# Patient Record
Sex: MALE | Race: OTHER | Employment: FULL TIME | URBAN - METROPOLITAN AREA
[De-identification: names, ages, dates, MRNs, and addresses within clinical notes are randomized per-mention and may not be internally consistent; named-entity substitution may affect disease eponyms.]

---

## 2020-06-10 ENCOUNTER — TELEPHONE (OUTPATIENT)
Dept: ADMINISTRATIVE | Age: 50
End: 2020-06-10

## 2020-06-10 ENCOUNTER — TELEPHONE (OUTPATIENT)
Dept: INTERNAL MEDICINE CLINIC | Age: 50
End: 2020-06-10

## 2020-06-10 NOTE — TELEPHONE ENCOUNTER
Patient's brother called to make appointment to establish pcp. Attempted to conference in [address] [facility] . Phone line disconnected.

## 2021-10-14 ENCOUNTER — TELEPHONE (OUTPATIENT)
Dept: FAMILY MEDICINE CLINIC | Age: 51
End: 2021-10-14

## 2021-10-14 NOTE — TELEPHONE ENCOUNTER
Pt would like to be a NP with Dr Tisha Ward.    He needs a Canadian speaking Dr    Please advise    CB:  [phone]

## 2022-01-13 ENCOUNTER — OFFICE VISIT (OUTPATIENT)
Dept: ORTHOPEDIC SURGERY | Age: 52
End: 2022-01-13
Payer: COMMERCIAL

## 2022-01-13 VITALS — WEIGHT: 200 LBS | BODY MASS INDEX: 31.39 KG/M2 | HEIGHT: 67 IN

## 2022-01-13 DIAGNOSIS — S83.242A ACUTE MEDIAL MENISCUS TEAR, LEFT, INITIAL ENCOUNTER: ICD-10-CM

## 2022-01-13 DIAGNOSIS — M25.562 LEFT KNEE PAIN, UNSPECIFIED CHRONICITY: Primary | ICD-10-CM

## 2022-01-13 PROCEDURE — G8427 DOCREV CUR MEDS BY ELIG CLIN: HCPCS | Performed by: ORTHOPAEDIC SURGERY

## 2022-01-13 PROCEDURE — 1036F TOBACCO NON-USER: CPT | Performed by: ORTHOPAEDIC SURGERY

## 2022-01-13 PROCEDURE — 3017F COLORECTAL CA SCREEN DOC REV: CPT | Performed by: ORTHOPAEDIC SURGERY

## 2022-01-13 PROCEDURE — G8419 CALC BMI OUT NRM PARAM NOF/U: HCPCS | Performed by: ORTHOPAEDIC SURGERY

## 2022-01-13 PROCEDURE — 99203 OFFICE O/P NEW LOW 30 MIN: CPT | Performed by: ORTHOPAEDIC SURGERY

## 2022-01-13 PROCEDURE — G8484 FLU IMMUNIZE NO ADMIN: HCPCS | Performed by: ORTHOPAEDIC SURGERY

## 2022-01-13 NOTE — PROGRESS NOTES
[facility]  History and Physical  Knee Pain    Date:  2022    Name:  Joe Henderson  Address:  [address] [place] [address]    :  1970      Age:   46 y.o.    SSN:  [id number]      Medical Record Number:  [id number]      Chief Complaint    New Patient (L knee)      History of Present Illness:  Joe Henderson is a 46 y.o. male who presents for left knee pain of 3 weeks duration. Patient works as a , used to work as a  with no specific injury started to complain of left knee pain, pain increases by bending and twisting activities, relieved by rest.  Patient is taking ibuprofen 800 mg to decrease his pain with moderate improvement, denies locking of the knee joint. Denies any previous history of similar symptoms. Patient also complains of bilateral ankle pain that started after her knee pain. The patient denies any new injury. The patient denies any clicking, popping, catching, giving way, joint locking, numbness, paresthesias, or weakness.       Pain Assessment  Location of Pain: Knee  Location Modifiers: Left  Quality of Pain: Dull,Aching  Duration of Pain: Persistent  Frequency of Pain: Constant  Result of Injury: No  Work-Related Injury: No  Are there other pain locations you wish to document?: No    Past Medical History:   Diagnosis Date    Appendicitis         Past Surgical History:   Procedure Laterality Date    APPENDECTOMY         Family History   Problem Relation Age of Onset    Diabetes Father     Diabetes Paternal Grandmother     Diabetes Paternal Uncle     Prostate Cancer Paternal Uncle     Prostate Cancer Paternal Uncle        Social History     Socioeconomic History    Marital status:      Spouse name: None    Number of children: 1    Years of education: None    Highest education level: None   Occupational History    None   Tobacco Use    Smoking status: Never Smoker    Smokeless tobacco: Never Used   Substance and Sexual Activity    Alcohol use: Yes     Alcohol/week: 5.0 standard drinks     Types: 6 drink(s) per week    Drug use: No    Sexual activity: Never   Other Topics Concern    None   Social History Narrative    None     Social Determinants of Health     Financial Resource Strain:     Difficulty of Paying Living Expenses: Not on file   Food Insecurity:     Worried About Running Out of Food in the Last Year: Not on file    Patti of Food in the Last Year: Not on file   Transportation Needs:     Lack of Transportation (Medical): Not on file    Lack of Transportation (Non-Medical): Not on file   Physical Activity:     Days of Exercise per Week: Not on file    Minutes of Exercise per Session: Not on file   Stress:     Feeling of Stress : Not on file   Social Connections:     Frequency of Communication with Friends and Family: Not on file    Frequency of Social Gatherings with Friends and Family: Not on file    Attends Anabaptism Services: Not on file    Active Member of [address] [place] [address] or Organizations: Not on file    Attends Club or Organization Meetings: Not on file    Marital Status: Not on file   Intimate Partner Violence:     Fear of Current or Ex-Partner: Not on file    Emotionally Abused: Not on file    Physically Abused: Not on file    Sexually Abused: Not on file   Housing Stability:     Unable to Pay for Housing in the Last Year: Not on file    Number of [place] in the Last Year: Not on file    Unstable Housing in the Last Year: Not on file       Current Outpatient Medications   Medication Sig Dispense Refill    sildenafil (VIAGRA) 50 MG tablet Take 1 tablet by mouth as needed for Erectile Dysfunction. 15 tablet 3    triamcinolone (KENALOG) 0.1 % lotion Apply topically to affected area  2- 3 times daily. 30 mL 1    clotrimazole (LOTRIMIN) 1 % cream Apply topically 2 times daily.  30 g 1    omeprazole (PRILOSEC) 20 MG capsule Take 1 capsule by mouth daily. 30 capsule 3     No current facility-administered medications for this visit. No Known Allergies      Review of Systems:  A 14 point review of systems was completed by the patient and is available in the media section of the scanned medical record and was reviewed on 1/13/2022. The review is negative with the exception of those things mentioned in the HPI and Past Medical History     Review of Systems   Constitutional: Negative for fever and chills. HENT: Negative for congestion and eye pain. Eyes: Negative for blurred vision and double vision. Respiratory: Negative for cough, shortness of breath and wheezing. Cardiovascular: Negative for chest pain and palpitations. Gastrointestinal: Negative for nausea. Negative for vomiting. Musculoskeletal: Positive for myalgias and joint pain left knee. Skin: Negative for itching and rash. Neurological: Negative for dizziness, sensory change and headaches. Psychiatric/Behavioral: Negative for depression and suicidal ideas. Vital Signs:   Ht 5' 7\" (1.702 m)   Wt 200 lb (90.7 kg)   BMI 31.32 kg/m²     General Exam:    General: Angeles Padilla is a healthy and well appearing 46y.o. year old male who is sitting comfortably in our office in no acute distress. Neuro: Alert & Oriented x 3,  normal,  no focal deficits noted. Normal mood & affect. Knee Examination: Right    Inspection: Mild effusion, no ecchymosis, discoloration, erythema, excessive warmth. no gross deformities, no signs of infection. Palpation: There is positive patellofemoral crepitus, there is medial joint line tenderness. No other osseous or soft tissue tenderness around the knee. Negative calf tenderness    Range of Motion:   0-130 degrees without pain or difficulty    Strength:  5/5 quadriceps, 5/5 hamstrings    Special Tests:   No ligament instability, valgus and varus stress test are stable at 0 and 30°. Lachman's exhibits a solid endpoint.   Negative posterior drawer. Negative Homans sign    Gait:  Steady, Non antalgic, without the use of assistive devices    Alignment:  Varus deformity    Neuro: Sensation equal bilateral lower extremities    Vascular: 2+ posterior tibialis pulse      Contralateral Knee Comparison Examination: Left    Inspection:  No effusion, ecchymosis, discoloration, erythema, excessive warmth. no gross deformities, no signs of infection. Palpation: There is no patellofemoral crepitus, there is no joint line tenderness. No other osseous or soft tissue tenderness around the knee. Negative calf tenderness    Range of Motion:  0-130 degrees without pain or difficulty    Strength:  5/5 quadriceps, 5/5 hamstrings    Special Tests: Positive hyperflexion test   No ligament instability, valgus and varus stress test are stable at 0 and 30°. Lachman's exhibits a solid endpoint. Negative posterior drawer. Negative Homans sign    Gait:  Steady, Non antalgic, without the use of assistive devices    Alignment: No Varus deformity    Neuro: Sensation equal bilateral lower extremities    Vascular: 2+ posterior tibialis pulse      Radiology:     X-rays obtained and reviewed in office: AP and merchant view of both knees and a lateral of the left:  No abnormality detected apart from mild knee osteoarthritis.              Impression:  No abnormality detected on x-ray        Office Procedures:  Orders Placed This Encounter   Procedures    XR KNEE LEFT (3 VIEWS)     Standing Status:   Future     Number of Occurrences:   1     Standing Expiration Date:   1/13/2023     Order Specific Question:   Reason for exam:     Answer:   pain            Assessment: Patient is a 46 y.o. male suspected left knee medial meniscus tear    Visit Diagnoses       Codes    Left knee pain, unspecified chronicity    -  Primary M25.562    Acute medial meniscus tear, left, initial encounter     S83.242A          No orders of the defined types were placed in this encounter. Medical decision making:  Patient's workup and evaluation were reviewed with the patient in the office today. All the patient's questions were answered while in the clinic. The patient is understanding of all instructions and agrees with the plan. Patient has medial joint line tenderness with positive hyperflexion test, which raises the concern of medial viscus tear, we will proceed with left knee MRI without contrast, we will follow the patient after the MRI    Treatment Plan:    1. Left knee MRI  2. Activity modification  3. Ice 20 minutes ever 1-2 hours PRN  4. NSAIDs as needed  5. Rest   6. Elevation at least 2 inches above the heart with pillows supporting the joint underneath  7. Lightweight exercise/low impact exercise  8. Appropriate diet/weight management      Follow up: After MRI  This patient exhibits moderate complexity given previous history and physical exam, review of previous surgeries, obtaining independent history from the patient, review of the x-ray imaging and independent interpretation of imaging, and coordinating care with the patient as well as another physician. The patient is moderate risk for planning of an elective surgery with multiple comorbidities/risks. Total time spent for evaluation, education, and development of treatment plan: 30 minutes. Josefina Reese MD  Orthopedic Surgeon  [facility]), Clinical Fellow, [address] [place] [address]   1/13/2022      This dictation was performed with a verbal recognition program [facility] [employer]) and it was checked for errors. It is possible that there are still dictated errors within this office note. If so, please bring any errors to my attention for an addendum. All efforts were made to ensure that this office note is accurate. This dictation was performed with a verbal recognition program (DRAGON) and it was checked for errors. It is possible that there are still dictated errors within this office note.   If so, please bring any errors to my attention for an addendum. All efforts were made to ensure that this office note is accurate. Supervising Physician Attestation:  I, Dr. Malick Schmidt, personally performed the services described in this documentation as scribed above, and it is both accurate and complete and I agree with all pertinent clinical information. I personally interviewed the patient and performed a physical examination and medical decision making. I discussed the patient's condition and treatment options and have  reviewed and agree with the past medical, family and social history unless otherwise noted. All of the patient's questions were answered.       Board Certified Orthopaedic Surgeon  [facility] and [address] and [address] and [employer]  Professor of [address]

## 2022-01-17 ENCOUNTER — TELEPHONE (OUTPATIENT)
Dept: ORTHOPEDIC SURGERY | Age: 52
End: 2022-01-17

## 2022-01-17 NOTE — TELEPHONE ENCOUNTER
General Question     Subject: PATIENTS SON IS CALLING REGARDING INSURANCE APPROVAL FOR CORTISONE INJECTIONS IN PATIENTS L KNEE. PLEASE ADVISE.    Patient: Fausto Hernandez  Contact Number: [phone]

## 2022-01-18 DIAGNOSIS — S83.242A ACUTE MEDIAL MENISCUS TEAR, LEFT, INITIAL ENCOUNTER: Primary | ICD-10-CM

## 2022-01-19 ENCOUNTER — TELEPHONE (OUTPATIENT)
Dept: ORTHOPEDIC SURGERY | Age: 52
End: 2022-01-19

## 2022-01-19 NOTE — TELEPHONE ENCOUNTER
Called son and told him someone from [employer] will contact him to make an appointment for his dad

## 2022-01-19 NOTE — TELEPHONE ENCOUNTER
General Question     Subject: PATIENT'S SON IS CALLING WANTING TO KNOW IF INSURANCE APPROVAL HAS BEEN GIVEN FOR LEFT KNEE INJECTION. PLEASE ADVISE.       Roberto Olsen  Contact Number: [phone]

## 2022-01-25 ENCOUNTER — OFFICE VISIT (OUTPATIENT)
Dept: ORTHOPEDIC SURGERY | Age: 52
End: 2022-01-25
Payer: COMMERCIAL

## 2022-01-25 ENCOUNTER — TELEPHONE (OUTPATIENT)
Dept: ORTHOPEDIC SURGERY | Age: 52
End: 2022-01-25

## 2022-01-25 VITALS — BODY MASS INDEX: 31.39 KG/M2 | HEIGHT: 67 IN | WEIGHT: 200 LBS

## 2022-01-25 DIAGNOSIS — S83.282A ACUTE LATERAL MENISCUS TEAR OF LEFT KNEE, INITIAL ENCOUNTER: ICD-10-CM

## 2022-01-25 DIAGNOSIS — S83.242A ACUTE MEDIAL MENISCUS TEAR OF LEFT KNEE, INITIAL ENCOUNTER: Primary | ICD-10-CM

## 2022-01-25 PROCEDURE — G8427 DOCREV CUR MEDS BY ELIG CLIN: HCPCS | Performed by: ORTHOPAEDIC SURGERY

## 2022-01-25 PROCEDURE — 99214 OFFICE O/P EST MOD 30 MIN: CPT | Performed by: ORTHOPAEDIC SURGERY

## 2022-01-25 PROCEDURE — G8484 FLU IMMUNIZE NO ADMIN: HCPCS | Performed by: ORTHOPAEDIC SURGERY

## 2022-01-25 PROCEDURE — 3017F COLORECTAL CA SCREEN DOC REV: CPT | Performed by: ORTHOPAEDIC SURGERY

## 2022-01-25 PROCEDURE — G8417 CALC BMI ABV UP PARAM F/U: HCPCS | Performed by: ORTHOPAEDIC SURGERY

## 2022-01-25 PROCEDURE — MISC250 COMPRESSION STOCKING: Performed by: ORTHOPAEDIC SURGERY

## 2022-01-25 PROCEDURE — 1036F TOBACCO NON-USER: CPT | Performed by: ORTHOPAEDIC SURGERY

## 2022-01-25 NOTE — PROGRESS NOTES
12 [facility]  History and Physical  Knee Pain    Date:  2022    Name:  Cecy Brennan  Address:  [address] [place] [address],[address]    :  1970      Age:   46 y.o.    SSN:  [id number]      Medical Record Number:  [id number]      Chief Complaint    Follow-up (MRI L knee)      History of Present Illness:  Cecy Brennan is a 46 y.o. male who presents for left knee pain of 5 weeks duration. Patient works as a , used to work as a  with no specific injury started to complain of left knee pain, pain increases by bending and twisting activities, relieved by rest.  Patient is taking ibuprofen 800 mg to decrease his pain with moderate improvement, denies locking of the knee joint. Denies any previous history of similar symptoms. Last time patient was seen we ordered an MRI. Patient is here today with MRI results    The patient denies any new injury. The patient denies any clicking, popping, catching, giving way, joint locking, numbness, paresthesias, or weakness. Past Medical History:   Diagnosis Date    Appendicitis         Past Surgical History:   Procedure Laterality Date    APPENDECTOMY         Family History   Problem Relation Age of Onset    Diabetes Father     Diabetes Paternal Grandmother     Diabetes Paternal Uncle     Prostate Cancer Paternal Uncle     Prostate Cancer Paternal Uncle        Social History     Socioeconomic History    Marital status:      Spouse name: None    Number of children: 1    Years of education: None    Highest education level: None   Occupational History    None   Tobacco Use    Smoking status: Never Smoker    Smokeless tobacco: Never Used   Substance and Sexual Activity    Alcohol use:  Yes     Alcohol/week: 5.0 standard drinks     Types: 6 drink(s) per week    Drug use: No    Sexual activity: Never   Other Topics Concern    None   Social History Narrative    None     Social Determinants of Health     Financial Resource Strain:     Difficulty of Paying Living Expenses: Not on file   Food Insecurity:     Worried About Running Out of Food in the Last Year: Not on file    Patti of Food in the Last Year: Not on file   Transportation Needs:     Lack of Transportation (Medical): Not on file    Lack of Transportation (Non-Medical): Not on file   Physical Activity:     Days of Exercise per Week: Not on file    Minutes of Exercise per Session: Not on file   Stress:     Feeling of Stress : Not on file   Social Connections:     Frequency of Communication with Friends and Family: Not on file    Frequency of Social Gatherings with Friends and Family: Not on file    Attends Jewish Services: Not on file    Active Member of [address] [place] [address] [email or web address] or Organizations: Not on file    Attends Club or Organization Meetings: Not on file    Marital Status: Not on file   Intimate Partner Violence:     Fear of Current or Ex-Partner: Not on file    Emotionally Abused: Not on file    Physically Abused: Not on file    Sexually Abused: Not on file   Housing Stability:     Unable to Pay for Housing in the Last Year: Not on file    Number of [place] in the Last Year: Not on file    Unstable Housing in the Last Year: Not on file       Current Outpatient Medications   Medication Sig Dispense Refill    sildenafil (VIAGRA) 50 MG tablet Take 1 tablet by mouth as needed for Erectile Dysfunction. 15 tablet 3    triamcinolone (KENALOG) 0.1 % lotion Apply topically to affected area  2- 3 times daily. 30 mL 1    clotrimazole (LOTRIMIN) 1 % cream Apply topically 2 times daily. 30 g 1    omeprazole (PRILOSEC) 20 MG capsule Take 1 capsule by mouth daily. 30 capsule 3     No current facility-administered medications for this visit.        No Known Allergies      Review of Systems:  A 14 point review of systems was completed by the patient and is available in the media section of the scanned medical record and was reviewed on 1/25/2022. The review is negative with the exception of those things mentioned in the HPI and Past Medical History     Review of Systems   Constitutional: Negative for fever and chills. HENT: Negative for congestion and eye pain. Eyes: Negative for blurred vision and double vision. Respiratory: Negative for cough, shortness of breath and wheezing. Cardiovascular: Negative for chest pain and palpitations. Gastrointestinal: Negative for nausea. Negative for vomiting. Musculoskeletal: Positive for myalgias and joint pain left knee. Skin: Negative for itching and rash. Neurological: Negative for dizziness, sensory change and headaches. Psychiatric/Behavioral: Negative for depression and suicidal ideas. Vital Signs:   Ht 5' 7\" (1.702 m)   Wt 200 lb (90.7 kg)   BMI 31.32 kg/m²     General Exam:    General: Salome Hernandez is a healthy and well appearing 46y.o. year old male who is sitting comfortably in our office in no acute distress. Neuro: Alert & Oriented x 3,  normal,  no focal deficits noted. Normal mood & affect. Knee Examination: Right    Inspection: Mild effusion, no ecchymosis, discoloration, erythema, excessive warmth. no gross deformities, no signs of infection. Palpation: There is positive patellofemoral crepitus, there is medial joint line tenderness. No other osseous or soft tissue tenderness around the knee. Negative calf tenderness    Range of Motion:   0-130 degrees without pain or difficulty    Strength:  5/5 quadriceps, 5/5 hamstrings    Special Tests:   No ligament instability, valgus and varus stress test are stable at 0 and 30°. Lachman's exhibits a solid endpoint. Negative posterior drawer.   Negative Homans sign    Gait:  Steady, Non antalgic, without the use of assistive devices    Alignment:  Varus deformity    Neuro: Sensation equal bilateral lower extremities    Vascular: 2+ posterior tibialis pulse      Contralateral Knee Comparison Examination: Left    Inspection:  No effusion, ecchymosis, discoloration, erythema, excessive warmth. no gross deformities, no signs of infection. Palpation: There is no patellofemoral crepitus, there is no joint line tenderness. No other osseous or soft tissue tenderness around the knee. Negative calf tenderness    Range of Motion:  0-130 degrees without pain or difficulty    Strength:  5/5 quadriceps, 5/5 hamstrings    Special Tests: Positive hyperflexion test   No ligament instability, valgus and varus stress test are stable at 0 and 30°. Lachman's exhibits a solid endpoint. Negative posterior drawer. Negative Homans sign    Gait:  Steady, Non antalgic, without the use of assistive devices    Alignment: No Varus deformity    Neuro: Sensation equal bilateral lower extremities    Vascular: 2+ posterior tibialis pulse      Radiology:     Left knee MRI:  3-4 horizontal tear anterior body and posterior horn body junction of the lateral meniscus. 3-4 horizontal tear tear body of the medial meniscus  Grade 1 MCL sprain  Intermediate grade patellofemoral chondromalacia  Semimembranosus bursitis. Impression:  Acute medial and lateral meniscus tear of left knee        Office Procedures:  Orders Placed This Encounter   Procedures    COMPRESSION STOCKING     Patient was supplied a Compression Sleeve. This retail item was supplied to provide functional support and assist in controlling swelling in the lower extremities. Verbal and written instructions for the use of and application of this item were provided. The patient was educated and fit by a healthcare professional with expert knowledge and specialization in brace application. They were instructed to contact the office immediately should the equipment result in increased pain, decreased sensation, increased swelling or worsening of the condition.             Assessment: Patient is a 46 y.o. male left knee medial and lateral meniscus acute  Visit Diagnoses       Codes    Acute medial meniscus tear of left knee, initial encounter    -  Primary Z44.658E    Acute lateral meniscus tear of left knee, initial encounter     S83.282A          No orders of the defined types were placed in this encounter. Medical decision making:  Patient's workup and evaluation were reviewed with the patient in the office today. All the patient's questions were answered while in the clinic. The patient is understanding of all instructions and agrees with the plan. Patient has acute medial and lateral meniscus tear, we will proceed with left knee arthroscopic meniscectomy, we discussed that only the torn part of meniscus will be excised, we will preserve stable none torn part of the meniscus, we believe that the patient will still have some pain after the knee scope surgery due to his patellofemoral and medial tibiofemoral arthritic changes. Treatment Plan:    1. Left knee arthroscopic medial and lateral left knee meniscectomy/repair  2. Activity modification  3. Ice 20 minutes ever 1-2 hours PRN  4. NSAIDs as needed  5. Rest   6. Elevation at least 2 inches above the heart with pillows supporting the joint underneath  7. Lightweight exercise/low impact exercise  8. Appropriate diet/weight management      Follow up: Preop visit  This patient exhibits moderate complexity given previous history and physical exam, review of previous surgeries, obtaining independent history from the patient, review of the x-ray imaging and independent interpretation of imaging, and coordinating care with the patient as well as another physician. The patient is moderate risk for planning of an elective surgery with multiple comorbidities/risks. Total time spent for evaluation, education, and development of treatment plan: 30 minutes.       Salomon Real MD  Orthopedic Surgeon  [facility]), Clinical Fellow, [address] [place] [address]   1/25/2022      This dictation was performed with a verbal recognition program (DRAGON) and it was checked for errors. It is possible that there are still dictated errors within this office note. If so, please bring any errors to my attention for an addendum. All efforts were made to ensure that this office note is accurate. This dictation was performed with a verbal recognition program (DRAGON) and it was checked for errors. It is possible that there are still dictated errors within this office note. If so, please bring any errors to my attention for an addendum. All efforts were made to ensure that this office note is accurate. Supervising Physician Attestation:  I, Dr. Scotty Cordoba, personally performed the services described in this documentation as scribed above, and it is both accurate and complete and I agree with all pertinent clinical information. I personally interviewed the patient and performed a physical examination and medical decision making. I discussed the patient's condition and treatment options and have  reviewed and agree with the past medical, family and social history unless otherwise noted. All of the patient's questions were answered.       Board Certified Orthopaedic Surgeon  [facility] and [address]  [facility] and [address] and [employer]  Professor of [address]

## 2022-01-26 NOTE — TELEPHONE ENCOUNTER
Talked to son and the insurance will be [facility] but a different #.  Will get # too me

## 2022-01-28 ENCOUNTER — TELEPHONE (OUTPATIENT)
Dept: ORTHOPEDIC SURGERY | Age: 52
End: 2022-01-28

## 2022-01-31 NOTE — TELEPHONE ENCOUNTER
Returned sons call and he still does not have the correct ID # for [facility].  Will call me back when he had the correct number

## 2022-02-01 ENCOUNTER — ANESTHESIA EVENT (OUTPATIENT)
Dept: OPERATING ROOM | Age: 52
End: 2022-02-01
Payer: COMMERCIAL

## 2022-02-01 ENCOUNTER — TELEPHONE (OUTPATIENT)
Dept: ORTHOPEDIC SURGERY | Age: 52
End: 2022-02-01

## 2022-02-01 NOTE — TELEPHONE ENCOUNTER
PATIENT IS ON FOR TOMORROW, INSURANCE IS STILL PENDING. NEEDS TO KNOW IF THE SURGERY IS STILL HAPPENING.

## 2022-02-02 ENCOUNTER — ANESTHESIA (OUTPATIENT)
Dept: OPERATING ROOM | Age: 52
End: 2022-02-02
Payer: COMMERCIAL

## 2022-02-02 ENCOUNTER — HOSPITAL ENCOUNTER (OUTPATIENT)
Age: 52
Setting detail: OUTPATIENT SURGERY
Discharge: HOME | End: 2022-02-02
Attending: ORTHOPAEDIC SURGERY | Admitting: ORTHOPAEDIC SURGERY
Payer: COMMERCIAL

## 2022-02-02 VITALS
DIASTOLIC BLOOD PRESSURE: 81 MMHG | OXYGEN SATURATION: 100 % | SYSTOLIC BLOOD PRESSURE: 123 MMHG | TEMPERATURE: 96.3 F | RESPIRATION RATE: 12 BRPM

## 2022-02-02 VITALS
WEIGHT: 215 LBS | OXYGEN SATURATION: 96 % | DIASTOLIC BLOOD PRESSURE: 93 MMHG | BODY MASS INDEX: 32.58 KG/M2 | HEART RATE: 77 BPM | TEMPERATURE: 96.8 F | SYSTOLIC BLOOD PRESSURE: 139 MMHG | RESPIRATION RATE: 21 BRPM | HEIGHT: 68 IN

## 2022-02-02 DIAGNOSIS — Z98.890 S/P LEFT KNEE ARTHROSCOPY: Primary | ICD-10-CM

## 2022-02-02 PROCEDURE — 2580000003 HC RX 258: Performed by: ANESTHESIOLOGY

## 2022-02-02 PROCEDURE — 7100000001 HC PACU RECOVERY - ADDTL 15 MIN: Performed by: ORTHOPAEDIC SURGERY

## 2022-02-02 PROCEDURE — 6370000000 HC RX 637 (ALT 250 FOR IP): Performed by: FAMILY MEDICINE

## 2022-02-02 PROCEDURE — 2720000010 HC SURG SUPPLY STERILE: Performed by: ORTHOPAEDIC SURGERY

## 2022-02-02 PROCEDURE — 7100000000 HC PACU RECOVERY - FIRST 15 MIN: Performed by: ORTHOPAEDIC SURGERY

## 2022-02-02 PROCEDURE — 6360000002 HC RX W HCPCS: Performed by: NURSE ANESTHETIST, CERTIFIED REGISTERED

## 2022-02-02 PROCEDURE — 3700000000 HC ANESTHESIA ATTENDED CARE: Performed by: ORTHOPAEDIC SURGERY

## 2022-02-02 PROCEDURE — 7100000011 HC PHASE II RECOVERY - ADDTL 15 MIN: Performed by: ORTHOPAEDIC SURGERY

## 2022-02-02 PROCEDURE — 2580000003 HC RX 258: Performed by: ORTHOPAEDIC SURGERY

## 2022-02-02 PROCEDURE — 3700000001 HC ADD 15 MINUTES (ANESTHESIA): Performed by: ORTHOPAEDIC SURGERY

## 2022-02-02 PROCEDURE — 2709999900 HC NON-CHARGEABLE SUPPLY: Performed by: ORTHOPAEDIC SURGERY

## 2022-02-02 PROCEDURE — 7100000010 HC PHASE II RECOVERY - FIRST 15 MIN: Performed by: ORTHOPAEDIC SURGERY

## 2022-02-02 PROCEDURE — 6360000002 HC RX W HCPCS: Performed by: FAMILY MEDICINE

## 2022-02-02 PROCEDURE — 2500000003 HC RX 250 WO HCPCS: Performed by: NURSE ANESTHETIST, CERTIFIED REGISTERED

## 2022-02-02 PROCEDURE — 3600000014 HC SURGERY LEVEL 4 ADDTL 15MIN: Performed by: ORTHOPAEDIC SURGERY

## 2022-02-02 PROCEDURE — 6360000002 HC RX W HCPCS: Performed by: ORTHOPAEDIC SURGERY

## 2022-02-02 PROCEDURE — 3600000004 HC SURGERY LEVEL 4 BASE: Performed by: ORTHOPAEDIC SURGERY

## 2022-02-02 RX ORDER — IBUPROFEN 800 MG/1
800 TABLET ORAL EVERY 8 HOURS PRN
COMMUNITY
Start: 2022-01-03

## 2022-02-02 RX ORDER — LIDOCAINE HYDROCHLORIDE 10 MG/ML
1 INJECTION, SOLUTION EPIDURAL; INFILTRATION; INTRACAUDAL; PERINEURAL
Status: DISCONTINUED | OUTPATIENT
Start: 2022-02-02 | End: 2022-02-02 | Stop reason: HOSPADM

## 2022-02-02 RX ORDER — OXYCODONE HYDROCHLORIDE 5 MG/1
10 TABLET ORAL PRN
Status: COMPLETED | OUTPATIENT
Start: 2022-02-02 | End: 2022-02-02

## 2022-02-02 RX ORDER — SODIUM CHLORIDE 0.9 % (FLUSH) 0.9 %
5-40 SYRINGE (ML) INJECTION PRN
Status: DISCONTINUED | OUTPATIENT
Start: 2022-02-02 | End: 2022-02-02 | Stop reason: HOSPADM

## 2022-02-02 RX ORDER — ONDANSETRON 2 MG/ML
INJECTION INTRAMUSCULAR; INTRAVENOUS PRN
Status: DISCONTINUED | OUTPATIENT
Start: 2022-02-02 | End: 2022-02-02 | Stop reason: SDUPTHER

## 2022-02-02 RX ORDER — ROPIVACAINE HYDROCHLORIDE 5 MG/ML
INJECTION, SOLUTION EPIDURAL; INFILTRATION; PERINEURAL PRN
Status: DISCONTINUED | OUTPATIENT
Start: 2022-02-02 | End: 2022-02-02 | Stop reason: ALTCHOICE

## 2022-02-02 RX ORDER — PROMETHAZINE HYDROCHLORIDE 25 MG/ML
6.25 INJECTION, SOLUTION INTRAMUSCULAR; INTRAVENOUS PRN
Status: DISCONTINUED | OUTPATIENT
Start: 2022-02-02 | End: 2022-02-02 | Stop reason: HOSPADM

## 2022-02-02 RX ORDER — SENNA PLUS 8.6 MG/1
1 TABLET ORAL 2 TIMES DAILY PRN
Qty: 14 TABLET | Refills: 0 | Status: SHIPPED | OUTPATIENT
Start: 2022-02-02 | End: 2022-02-09

## 2022-02-02 RX ORDER — NAPROXEN 500 MG/1
500 TABLET ORAL 2 TIMES DAILY PRN
COMMUNITY
Start: 2022-01-18

## 2022-02-02 RX ORDER — FENTANYL CITRATE 50 UG/ML
INJECTION, SOLUTION INTRAMUSCULAR; INTRAVENOUS PRN
Status: DISCONTINUED | OUTPATIENT
Start: 2022-02-02 | End: 2022-02-02 | Stop reason: SDUPTHER

## 2022-02-02 RX ORDER — GLYCOPYRROLATE 1 MG/5 ML
SYRINGE (ML) INTRAVENOUS PRN
Status: DISCONTINUED | OUTPATIENT
Start: 2022-02-02 | End: 2022-02-02 | Stop reason: SDUPTHER

## 2022-02-02 RX ORDER — OXYCODONE HYDROCHLORIDE AND ACETAMINOPHEN 5; 325 MG/1; MG/1
1 TABLET ORAL EVERY 6 HOURS PRN
Qty: 35 TABLET | Refills: 0 | Status: SHIPPED | OUTPATIENT
Start: 2022-02-02 | End: 2022-02-09

## 2022-02-02 RX ORDER — EPHEDRINE SULFATE 50 MG/ML
INJECTION INTRAVENOUS PRN
Status: DISCONTINUED | OUTPATIENT
Start: 2022-02-02 | End: 2022-02-02 | Stop reason: SDUPTHER

## 2022-02-02 RX ORDER — ASPIRIN 81 MG/1
81 TABLET ORAL DAILY
Qty: 30 TABLET | Refills: 0 | Status: SHIPPED | OUTPATIENT
Start: 2022-02-02

## 2022-02-02 RX ORDER — LABETALOL HYDROCHLORIDE 5 MG/ML
5 INJECTION, SOLUTION INTRAVENOUS EVERY 10 MIN PRN
Status: DISCONTINUED | OUTPATIENT
Start: 2022-02-02 | End: 2022-02-02 | Stop reason: HOSPADM

## 2022-02-02 RX ORDER — SODIUM CHLORIDE 9 MG/ML
25 INJECTION, SOLUTION INTRAVENOUS PRN
Status: DISCONTINUED | OUTPATIENT
Start: 2022-02-02 | End: 2022-02-02 | Stop reason: HOSPADM

## 2022-02-02 RX ORDER — OXYCODONE HYDROCHLORIDE 5 MG/1
5 TABLET ORAL PRN
Status: COMPLETED | OUTPATIENT
Start: 2022-02-02 | End: 2022-02-02

## 2022-02-02 RX ORDER — ONDANSETRON 4 MG/1
4 TABLET, FILM COATED ORAL EVERY 8 HOURS PRN
Qty: 20 TABLET | Refills: 0 | Status: SHIPPED | OUTPATIENT
Start: 2022-02-02

## 2022-02-02 RX ORDER — MEPERIDINE HYDROCHLORIDE 25 MG/ML
12.5 INJECTION INTRAMUSCULAR; INTRAVENOUS; SUBCUTANEOUS EVERY 5 MIN PRN
Status: DISCONTINUED | OUTPATIENT
Start: 2022-02-02 | End: 2022-02-02 | Stop reason: HOSPADM

## 2022-02-02 RX ORDER — HYDRALAZINE HYDROCHLORIDE 20 MG/ML
5 INJECTION INTRAMUSCULAR; INTRAVENOUS EVERY 10 MIN PRN
Status: DISCONTINUED | OUTPATIENT
Start: 2022-02-02 | End: 2022-02-02 | Stop reason: HOSPADM

## 2022-02-02 RX ORDER — SODIUM CHLORIDE 0.9 % (FLUSH) 0.9 %
5-40 SYRINGE (ML) INJECTION EVERY 12 HOURS SCHEDULED
Status: DISCONTINUED | OUTPATIENT
Start: 2022-02-02 | End: 2022-02-02 | Stop reason: HOSPADM

## 2022-02-02 RX ORDER — SODIUM CHLORIDE, SODIUM LACTATE, POTASSIUM CHLORIDE, AND CALCIUM CHLORIDE .6; .31; .03; .02 G/100ML; G/100ML; G/100ML; G/100ML
IRRIGANT IRRIGATION PRN
Status: DISCONTINUED | OUTPATIENT
Start: 2022-02-02 | End: 2022-02-02 | Stop reason: ALTCHOICE

## 2022-02-02 RX ORDER — LIDOCAINE HYDROCHLORIDE 20 MG/ML
INJECTION, SOLUTION INTRAVENOUS PRN
Status: DISCONTINUED | OUTPATIENT
Start: 2022-02-02 | End: 2022-02-02 | Stop reason: SDUPTHER

## 2022-02-02 RX ORDER — DIPHENHYDRAMINE HYDROCHLORIDE 50 MG/ML
12.5 INJECTION INTRAMUSCULAR; INTRAVENOUS
Status: DISCONTINUED | OUTPATIENT
Start: 2022-02-02 | End: 2022-02-02 | Stop reason: HOSPADM

## 2022-02-02 RX ORDER — HYDROCODONE BITARTRATE AND ACETAMINOPHEN 5; 325 MG/1; MG/1
1 TABLET ORAL EVERY 6 HOURS PRN
COMMUNITY
Start: 2022-01-18

## 2022-02-02 RX ORDER — MIDAZOLAM HYDROCHLORIDE 1 MG/ML
INJECTION INTRAMUSCULAR; INTRAVENOUS PRN
Status: DISCONTINUED | OUTPATIENT
Start: 2022-02-02 | End: 2022-02-02 | Stop reason: SDUPTHER

## 2022-02-02 RX ORDER — DEXAMETHASONE SODIUM PHOSPHATE 4 MG/ML
INJECTION, SOLUTION INTRA-ARTICULAR; INTRALESIONAL; INTRAMUSCULAR; INTRAVENOUS; SOFT TISSUE PRN
Status: DISCONTINUED | OUTPATIENT
Start: 2022-02-02 | End: 2022-02-02 | Stop reason: SDUPTHER

## 2022-02-02 RX ORDER — SODIUM CHLORIDE, SODIUM LACTATE, POTASSIUM CHLORIDE, CALCIUM CHLORIDE 600; 310; 30; 20 MG/100ML; MG/100ML; MG/100ML; MG/100ML
INJECTION, SOLUTION INTRAVENOUS CONTINUOUS
Status: DISCONTINUED | OUTPATIENT
Start: 2022-02-02 | End: 2022-02-02 | Stop reason: HOSPADM

## 2022-02-02 RX ORDER — FENTANYL CITRATE 50 UG/ML
50 INJECTION, SOLUTION INTRAMUSCULAR; INTRAVENOUS EVERY 5 MIN PRN
Status: DISCONTINUED | OUTPATIENT
Start: 2022-02-02 | End: 2022-02-02 | Stop reason: HOSPADM

## 2022-02-02 RX ORDER — PROPOFOL 10 MG/ML
INJECTION, EMULSION INTRAVENOUS PRN
Status: DISCONTINUED | OUTPATIENT
Start: 2022-02-02 | End: 2022-02-02 | Stop reason: SDUPTHER

## 2022-02-02 RX ADMIN — FENTANYL CITRATE 50 MCG: 50 INJECTION, SOLUTION INTRAMUSCULAR; INTRAVENOUS at 11:18

## 2022-02-02 RX ADMIN — SODIUM CHLORIDE, POTASSIUM CHLORIDE, SODIUM LACTATE AND CALCIUM CHLORIDE: 600; 310; 30; 20 INJECTION, SOLUTION INTRAVENOUS at 09:33

## 2022-02-02 RX ADMIN — FENTANYL CITRATE 50 MCG: 50 INJECTION, SOLUTION INTRAMUSCULAR; INTRAVENOUS at 11:01

## 2022-02-02 RX ADMIN — MIDAZOLAM HYDROCHLORIDE 2 MG: 2 INJECTION, SOLUTION INTRAMUSCULAR; INTRAVENOUS at 09:55

## 2022-02-02 RX ADMIN — FENTANYL CITRATE 50 MCG: 50 INJECTION, SOLUTION INTRAMUSCULAR; INTRAVENOUS at 09:58

## 2022-02-02 RX ADMIN — PROPOFOL 200 MG: 10 INJECTION, EMULSION INTRAVENOUS at 09:59

## 2022-02-02 RX ADMIN — Medication 0.2 MG: at 09:58

## 2022-02-02 RX ADMIN — HYDROMORPHONE HYDROCHLORIDE 0.25 MG: 1 INJECTION, SOLUTION INTRAMUSCULAR; INTRAVENOUS; SUBCUTANEOUS at 12:20

## 2022-02-02 RX ADMIN — PROPOFOL 30 MG: 10 INJECTION, EMULSION INTRAVENOUS at 11:18

## 2022-02-02 RX ADMIN — FENTANYL CITRATE 50 MCG: 50 INJECTION, SOLUTION INTRAMUSCULAR; INTRAVENOUS at 10:36

## 2022-02-02 RX ADMIN — LIDOCAINE HYDROCHLORIDE 40 MG: 20 INJECTION, SOLUTION INTRAVENOUS at 11:18

## 2022-02-02 RX ADMIN — ONDANSETRON 4 MG: 2 INJECTION INTRAMUSCULAR; INTRAVENOUS at 10:09

## 2022-02-02 RX ADMIN — SODIUM CHLORIDE, POTASSIUM CHLORIDE, SODIUM LACTATE AND CALCIUM CHLORIDE: 600; 310; 30; 20 INJECTION, SOLUTION INTRAVENOUS at 10:32

## 2022-02-02 RX ADMIN — DEXAMETHASONE SODIUM PHOSPHATE 4 MG: 4 INJECTION, SOLUTION INTRAMUSCULAR; INTRAVENOUS at 10:09

## 2022-02-02 RX ADMIN — EPHEDRINE SULFATE 5 MG: 50 INJECTION INTRAVENOUS at 10:03

## 2022-02-02 RX ADMIN — EPHEDRINE SULFATE 5 MG: 50 INJECTION INTRAVENOUS at 10:17

## 2022-02-02 RX ADMIN — LIDOCAINE HYDROCHLORIDE 50 MG: 20 INJECTION, SOLUTION INTRAVENOUS at 09:59

## 2022-02-02 RX ADMIN — HYDROMORPHONE HYDROCHLORIDE 0.25 MG: 1 INJECTION, SOLUTION INTRAMUSCULAR; INTRAVENOUS; SUBCUTANEOUS at 12:41

## 2022-02-02 RX ADMIN — OXYCODONE HYDROCHLORIDE 10 MG: 5 TABLET ORAL at 12:47

## 2022-02-02 RX ADMIN — HYDROMORPHONE HYDROCHLORIDE 0.5 MG: 1 INJECTION, SOLUTION INTRAMUSCULAR; INTRAVENOUS; SUBCUTANEOUS at 12:05

## 2022-02-02 ASSESSMENT — PAIN SCALES - GENERAL
PAINLEVEL_OUTOF10: 6
PAINLEVEL_OUTOF10: 6
PAINLEVEL_OUTOF10: 4
PAINLEVEL_OUTOF10: 0
PAINLEVEL_OUTOF10: 7
PAINLEVEL_OUTOF10: 5

## 2022-02-02 ASSESSMENT — PULMONARY FUNCTION TESTS
PIF_VALUE: 30
PIF_VALUE: 16
PIF_VALUE: 21
PIF_VALUE: 33
PIF_VALUE: 20
PIF_VALUE: 19
PIF_VALUE: 21
PIF_VALUE: 34
PIF_VALUE: 22
PIF_VALUE: 21
PIF_VALUE: 18
PIF_VALUE: 34
PIF_VALUE: 16
PIF_VALUE: 35
PIF_VALUE: 30
PIF_VALUE: 30
PIF_VALUE: 21
PIF_VALUE: 17
PIF_VALUE: 19
PIF_VALUE: 16
PIF_VALUE: 30
PIF_VALUE: 26
PIF_VALUE: 33
PIF_VALUE: 22
PIF_VALUE: 29
PIF_VALUE: 11
PIF_VALUE: 29
PIF_VALUE: 1
PIF_VALUE: 29
PIF_VALUE: 21
PIF_VALUE: 27
PIF_VALUE: 26
PIF_VALUE: 29
PIF_VALUE: 19
PIF_VALUE: 28
PIF_VALUE: 12
PIF_VALUE: 22
PIF_VALUE: 31
PIF_VALUE: 30
PIF_VALUE: 19
PIF_VALUE: 21
PIF_VALUE: 1
PIF_VALUE: 1
PIF_VALUE: 34
PIF_VALUE: 13
PIF_VALUE: 30
PIF_VALUE: 23
PIF_VALUE: 18
PIF_VALUE: 35
PIF_VALUE: 31
PIF_VALUE: 35
PIF_VALUE: 29
PIF_VALUE: 19
PIF_VALUE: 22
PIF_VALUE: 20
PIF_VALUE: 34
PIF_VALUE: 35
PIF_VALUE: 34
PIF_VALUE: 34
PIF_VALUE: 29
PIF_VALUE: 23
PIF_VALUE: 27
PIF_VALUE: 33
PIF_VALUE: 12
PIF_VALUE: 20
PIF_VALUE: 30
PIF_VALUE: 20
PIF_VALUE: 34
PIF_VALUE: 22
PIF_VALUE: 20
PIF_VALUE: 22
PIF_VALUE: 17
PIF_VALUE: 12
PIF_VALUE: 35
PIF_VALUE: 20
PIF_VALUE: 13
PIF_VALUE: 2
PIF_VALUE: 23
PIF_VALUE: 33
PIF_VALUE: 30
PIF_VALUE: 19
PIF_VALUE: 22
PIF_VALUE: 23
PIF_VALUE: 30
PIF_VALUE: 18
PIF_VALUE: 19
PIF_VALUE: 29
PIF_VALUE: 29
PIF_VALUE: 18
PIF_VALUE: 28
PIF_VALUE: 33
PIF_VALUE: 30
PIF_VALUE: 35
PIF_VALUE: 30
PIF_VALUE: 34
PIF_VALUE: 21
PIF_VALUE: 30
PIF_VALUE: 21
PIF_VALUE: 29
PIF_VALUE: 22
PIF_VALUE: 28
PIF_VALUE: 29
PIF_VALUE: 25
PIF_VALUE: 17
PIF_VALUE: 30
PIF_VALUE: 18
PIF_VALUE: 33
PIF_VALUE: 17
PIF_VALUE: 29
PIF_VALUE: 18
PIF_VALUE: 21
PIF_VALUE: 29

## 2022-02-02 ASSESSMENT — PAIN - FUNCTIONAL ASSESSMENT: PAIN_FUNCTIONAL_ASSESSMENT: 0-10

## 2022-02-02 ASSESSMENT — PAIN DESCRIPTION - LOCATION: LOCATION: KNEE

## 2022-02-02 ASSESSMENT — PAIN DESCRIPTION - PROGRESSION: CLINICAL_PROGRESSION: GRADUALLY IMPROVING

## 2022-02-02 ASSESSMENT — PAIN DESCRIPTION - ONSET: ONSET: ON-GOING

## 2022-02-02 ASSESSMENT — PAIN DESCRIPTION - FREQUENCY: FREQUENCY: CONTINUOUS

## 2022-02-02 ASSESSMENT — PAIN DESCRIPTION - PAIN TYPE: TYPE: SURGICAL PAIN

## 2022-02-02 ASSESSMENT — PAIN DESCRIPTION - ORIENTATION: ORIENTATION: LEFT

## 2022-02-02 ASSESSMENT — PAIN DESCRIPTION - DESCRIPTORS: DESCRIPTORS: DISCOMFORT

## 2022-02-02 NOTE — PROGRESS NOTES
Pt arrived form OR, s/p LEFT KNEE ARTHROSCOPY WITH MEDIAL AND LATERAL PARTIAL MENISCECTOMY - Left, report received form CRNA, pt speaks Khmer,  in waiting room until pt wakes up

## 2022-02-02 NOTE — PROGRESS NOTES
PACU Transfer to [facility]    Vitals:    02/02/22 1300   BP: (!) 139/93   Pulse: 77   Resp: 21   Temp: 96.8 °F (36 °C)   SpO2: 96%         Intake/Output Summary (Last 24 hours) at 2/2/2022 1313  Last data filed at 2/2/2022 1311  Gross per 24 hour   Intake 1590 ml   Output --   Net 1590 ml       Pain assessment: BP in range  Pain Level: 4    Patient transferred to care of VICKY RN.    2/2/2022 1:13 PM

## 2022-02-02 NOTE — ANESTHESIA PRE PROCEDURE
Department of Anesthesiology  Preprocedure Note       Name:  Kimberlee Zhao   Age:  46 y.o.  :  1970                                          MRN:  [id number]         Date:  2022      Surgeon: Vianney Fried):  Scotty Cordoba MD    Procedure: Procedure(s):  LEFT KNEE ARTHROSCOPY WITH MEDIAL AND LATERAL MENISCUS REPAIR/EXCISION    Medications prior to admission:   Prior to Admission medications    Medication Sig Start Date End Date Taking? Authorizing Provider   sildenafil (VIAGRA) 50 MG tablet Take 1 tablet by mouth as needed for Erectile Dysfunction. 14   John Garcia MD   triamcinolone (KENALOG) 0.1 % lotion Apply topically to affected area  2- 3 times daily. 14   John Garcia MD   clotrimazole (LOTRIMIN) 1 % cream Apply topically 2 times daily. 14   John Garcia MD   omeprazole (PRILOSEC) 20 MG capsule Take 1 capsule by mouth daily.  14   John Garcia MD       Current medications:    Current Facility-Administered Medications   Medication Dose Route Frequency Provider Last Rate Last Admin    meperidine (DEMEROL) injection 12.5 mg  12.5 mg IntraVENous Q5 Min PRN Abdirashid Means MD        HYDROmorphone (DILAUDID) injection 0.25 mg  0.25 mg IntraVENous Q5 Min PRN Abdirashid Means MD        fentaNYL (SUBLIMAZE) injection 50 mcg  50 mcg IntraVENous Q5 Min PRN Abdirashid Means MD        HYDROmorphone (DILAUDID) injection 0.25 mg  0.25 mg IntraVENous Q5 Min PRN Abdirashid Means MD        HYDROmorphone (DILAUDID) injection 0.5 mg  0.5 mg IntraVENous Q5 Min PRN Abdirashid Means MD        oxyCODONE (ROXICODONE) immediate release tablet 5 mg  5 mg Oral PRN Abdirashid Means MD        Or    oxyCODONE (ROXICODONE) immediate release tablet 10 mg  10 mg Oral PRN Abdirashid Means MD        [facility]) injection 6.25 mg  6.25 mg IntraVENous PRN Abdirashid Means MD        diphenhydrAMINE (BENADRYL) injection 12.5 mg  12.5 mg IntraVENous Once PRN Yvette Watts Gisele Sharma MD        labetalol (NORMODYNE;TRANDATE) injection 5 mg  5 mg IntraVENous Q10 Min PRN Hugo De Souza MD        hydrALAZINE (APRESOLINE) injection 5 mg  5 mg IntraVENous Q10 Min PRN Hugo De Souza MD           Allergies:  No Known Allergies    Problem List:    Patient Active Problem List   Diagnosis Code    Prediabetes R73.03    Hypertriglyceridemia E78.1    Erectile dysfunction N52.9       Past Medical History:        Diagnosis Date    Appendicitis        Past Surgical History:        Procedure Laterality Date    APPENDECTOMY  2010       Social History:    Social History     Tobacco Use    Smoking status: Never Smoker    Smokeless tobacco: Never Used   Substance Use Topics    Alcohol use: Yes     Alcohol/week: 5.0 standard drinks     Types: 6 drink(s) per week                                Counseling given: Not Answered      Vital Signs (Current): There were no vitals filed for this visit.                                            BP Readings from Last 3 Encounters:   12/09/14 102/80   11/24/14 118/62       NPO Status:                                                                                 BMI:   Wt Readings from Last 3 Encounters:   01/25/22 200 lb (90.7 kg)   01/13/22 200 lb (90.7 kg)   12/09/14 200 lb 9.6 oz (91 kg)     There is no height or weight on file to calculate BMI.    CBC:   Lab Results   Component Value Date    WBC 12.1 12/02/2014    RBC 5.00 12/02/2014    HGB 15.3 12/02/2014    HCT 45.8 12/02/2014    MCV 91.6 12/02/2014    RDW 13.4 12/02/2014     12/02/2014       CMP:   Lab Results   Component Value Date     12/02/2014    K 4.4 12/02/2014     12/02/2014    CO2 26 12/02/2014    BUN 13 12/02/2014    CREATININE 0.8 12/02/2014    GFRAA >60 12/02/2014    GFRAA >60 09/14/2010    AGRATIO 1.4 12/02/2014    LABGLOM >60 12/02/2014    GLUCOSE 111 12/02/2014    PROT 7.7 12/02/2014    PROT 7.6 09/14/2010    CALCIUM 9.2 12/02/2014    BILITOT 0.6 12/02/2014 ALKPHOS 68 12/02/2014    AST 18 12/02/2014    ALT 22 12/02/2014       POC Tests: No results for input(s): POCGLU, POCNA, POCK, POCCL, POCBUN, POCHEMO, POCHCT in the last 72 hours. Coags: No results found for: PROTIME, INR, APTT    HCG (If Applicable): No results found for: PREGTESTUR, PREGSERUM, HCG, HCGQUANT     ABGs: No results found for: PHART, PO2ART, RWS4CEP, GVN8XWL, BEART, W8NNPFTI     Type & Screen (If Applicable):  No results found for: LABABO, LABRH    Drug/Infectious Status (If Applicable):  No results found for: HIV, HEPCAB    COVID-19 Screening (If Applicable): No results found for: COVID19        Anesthesia Evaluation    Airway: Mallampati: II  TM distance: >3 FB   Neck ROM: full  Mouth opening: > = 3 FB Dental:          Pulmonary:                              Cardiovascular:            Rhythm: regular  Rate: normal                    Neuro/Psych:               GI/Hepatic/Renal:             Endo/Other:                     Abdominal:             Vascular: Other Findings:             Anesthesia Plan      general     ASA 2       Induction: intravenous. Anesthetic plan and risks discussed with patient. Plan discussed with CRNA.                   Mireya Dinero MD   2/2/2022

## 2022-02-02 NOTE — ANESTHESIA POSTPROCEDURE EVALUATION
Department of Anesthesiology  Postprocedure Note    Patient: Yobani Samano  MRN: [id number]  YOB: 1970  Date of evaluation: 2/2/2022  Time:  1:50 PM     Procedure Summary     Date: 02/02/22 Room / Location[place] [facility] 2[place] [address] [place] [address] / [facility]    Anesthesia Start: 4166 Anesthesia Stop: 1147    Procedure: LEFT KNEE ARTHROSCOPY WITH MEDIAL AND LATERAL PARTIAL MENISCECTOMY (Left ) Diagnosis:       Acute medial meniscus tear of left knee, initial encounter      Acute lateral meniscus tear of left knee, initial encounter      (Acute medial meniscus tear of left knee, initial encounter [S83.242A] Acute lateral meniscus tear of left knee, initial encounter [E17.477M])    Surgeons: Archana Day MD Responsible Provider: Tam Lerma MD    Anesthesia Type: general ASA Status: 2          Anesthesia Type: general    Rosa Phase I: Rosa Score: 10    Rosa Phase II:      Last vitals: Reviewed and per EMR flowsheets.        Anesthesia Post Evaluation    Patient location during evaluation: PACU  Level of consciousness: awake  Complications: no  Multimodal analgesia pain management approach

## 2022-02-02 NOTE — H&P
[address] [facility] Same Day Surgery Update H & P  Department of General Surgery   Surgical Service   Pre-operative History and Physical  Last H & P within the last 30 days. DIAGNOSIS:   Acute medial meniscus tear of left knee, initial encounter [S83.242A]  Acute lateral meniscus tear of left knee, initial encounter [S83.282A]    Procedure(s):  LEFT KNEE ARTHROSCOPY WITH MEDIAL AND LATERAL MENISCUS REPAIR/EXCISION     History obtained from: Patient interview and EHR, Completed with the aide of interpretor       HISTORY OF PRESENT ILLNESS:   The patient is a 46 y.o. male with c/o left knee pain and swelling in the setting of MRI demonstrated medial and lateral meniscus tears. Their symptoms have been recalcitrant to conservative treatment and the patient presents today for the above procedure. Covid 19:  Patient denies fever, chills, worsening cough, or known exposure to Covid-19. Past Medical History:        Diagnosis Date    Appendicitis      Past Surgical History:        Procedure Laterality Date    APPENDECTOMY  2010     Past Social History:  Social History     Socioeconomic History    Marital status:      Spouse name: Not on file    Number of children: 1    Years of education: Not on file    Highest education level: Not on file   Occupational History    Not on file   Tobacco Use    Smoking status: Never Smoker    Smokeless tobacco: Never Used   Substance and Sexual Activity    Alcohol use:  Yes     Alcohol/week: 5.0 standard drinks     Types: 6 drink(s) per week    Drug use: No    Sexual activity: Never   Other Topics Concern    Not on file   Social History Narrative    Not on file     Social Determinants of Health     Financial Resource Strain:     Difficulty of Paying Living Expenses: Not on file   Food Insecurity:     Worried About Running Out of Food in the Last Year: Not on file    Patti of Food in the Last Year: Not on [employer] Needs:     Lack of Transportation (Medical): Not on file    Lack of Transportation (Non-Medical): Not on file   Physical Activity:     Days of Exercise per Week: Not on file    Minutes of Exercise per Session: Not on file   Stress:     Feeling of Stress : Not on file   Social Connections:     Frequency of Communication with Friends and Family: Not on file    Frequency of Social Gatherings with Friends and Family: Not on file    Attends Ten[employer] Services: Not on file    Active Member of [address] [place] [address] or Organizations: Not on file    Attends Club or Organization Meetings: Not on file    Marital Status: Not on file   Intimate Partner Violence:     Fear of Current or Ex-Partner: Not on file    Emotionally Abused: Not on file    Physically Abused: Not on file    Sexually Abused: Not on file   Housing Stability:     Unable to Pay for Housing in the Last Year: Not on file    Number of [place] in the Last Year: Not on file    Unstable Housing in the Last Year: Not on file         Medications Prior to Admission:      Prior to Admission medications    Medication Sig Start Date End Date Taking? Authorizing Provider   sildenafil (VIAGRA) 50 MG tablet Take 1 tablet by mouth as needed for Erectile Dysfunction. 12/9/14   Daniel De Guzman MD   triamcinolone (KENALOG) 0.1 % lotion Apply topically to affected area  2- 3 times daily. 12/9/14   Daniel De Guzman MD   clotrimazole (LOTRIMIN) 1 % cream Apply topically 2 times daily. 12/9/14   Daniel De Guzman MD   omeprazole (PRILOSEC) 20 MG capsule Take 1 capsule by mouth daily. 11/24/14   Daniel De Guzman MD         Allergies:  Patient has no known allergies.     PHYSICAL EXAM:      /89   Pulse 78   Temp 97.2 °F (36.2 °C) (Temporal)   Resp 16   Ht 5' 8\" (1.727 m)   Wt 215 lb (97.5 kg)   SpO2 95%   BMI 32.69 kg/m²      Airway:  Airway patent with no audible stridor    Heart:  Regular rate and rhythm, No murmur noted    Lungs:  No increased work of breathing, good air exchange, clear to auscultation bilaterally, no crackles or wheezing    Abdomen:  Soft, non-distended, non-tender, no masses palpated    ASSESSMENT AND PLAN    Patient is a 46 y.o. male with above specified procedure planned. 1.  The patients history and physical was obtained and signed off by the pre-admission testing department. Patient seen and focused exam done today- no new changes since last physical exam on 1/28/22    2. Access to ancillary services are available per request of the provider.     HANNAH Cali - CNP     2/2/2022

## 2022-02-03 NOTE — OP NOTE
[facility] [address], [address]                                OPERATIVE REPORT    PATIENT NAME: [place]                     :        1970  MED REC NO:   [id number]                          ROOM:  ACCOUNT NO:   [de-identified]                           ADMIT DATE: 2022  PROVIDER:     Malick Schmidt MD    DATE OF PROCEDURE:  2022    PREOPERATIVE DIAGNOSIS:  Medial and lateral degenerative meniscus tears,  left knee. POSTOPERATIVE DIAGNOSIS:  Medial and lateral degenerative meniscus  tears, left knee. OPERATIONS PERFORMED:  1. Arthroscopically assisted partial medial meniscectomy, left knee. 2.  Arthroscopically assisted partial lateral meniscectomy, left knee. SURGEON:  Malick Schmidt MD    FIRST ASSISTANT:  Lara Contreras MD    ANESTHESIA:  General.    OPERATIVE INDICATIONS:  This patient has both medial and lateral joint  pain that has not responded to conservative treatment with MRI defining  complex meniscus tears. He underwent the educational program and  accepted the risk-benefit ratio having symptoms that are interfering  with all activities of daily living and his occupation. OPERATIVE FINDINGS:  1. Degenerative undersurface tear of the medial meniscus saving  approximately 70% of the meniscus. This procedure went well and the  remaining meniscus appeared to be very functional.  2.  Flap tear and complex tear, lateral meniscus, again conservatively  saving approximately 50% to 60% of the lateral meniscus. Again, this is  functioning. 3.  The articular cartilage to both compartments demonstrated very mild  fibrillation, but no major fragmentation which again is a good  prognosis. The operative procedure went well. There were no  complications and estimated blood loss at 15 mL.     OPERATIVE PROCEDURE:  This patient was placed in the supine position  upon the operating room table and after satisfactory level of general  anesthesia, the left knee was prepped and draped to a sterile surgical  field after identification of the signed limb and the timeout procedure. Under tourniquet control, inferior portals were used for the operative  procedure and superior portals for the fluid irrigation system. A moderate amount of redness was identified with the scope in the  inferolateral portal protecting the suprapatellar pouch indicative of a  chronic inflammation. Patellofemoral joint is normal without  fragmentation. Medial and lateral gutters were intact. The trochlea  was still intact. On entering the medial compartment, the degenerative  undersurface tear was identified and the [employer] Soft[employer] suction cutting device  was brought in and very nicely smoothed the undersurface without  complication. This did remove the posterior horn undersurface tear, but  retained the superior or proximal portion of the horizontal tear thereby  preserving meniscus function and thereby preserving the hoop stress. Both cruciate ligaments were intact. Ligamentum mucosum was present and  this was resected. On entering the lateral compartment, the flap tear  was identified and the basket brought in and this trimmed the flap tear. This did leave again approximately 50% of the lateral meniscus still  functionally intact. This completed the operative procedure. A minor  hemostasis for the ligamentum mucosum with the electrocoagulation. Portals were closed with interrupted 4-0 Monocryl with ropivacaine at  the portal sites. Ace cotton compression dressing was applied and the  patient returned to the recovery unit in excellent condition.         Tiffani Carrillo MD    D: 02/02/2022 11:54:51       T: 02/02/2022 12:00:06     [email or web address]  Job#: [id number]     Doc#: [id number]    CC:

## 2022-02-04 ENCOUNTER — HOSPITAL ENCOUNTER (OUTPATIENT)
Dept: PHYSICAL THERAPY | Age: 52
Setting detail: THERAPIES SERIES
Discharge: HOME | End: 2022-02-04
Payer: COMMERCIAL

## 2022-02-04 PROCEDURE — 97110 THERAPEUTIC EXERCISES: CPT | Performed by: PHYSICAL THERAPIST

## 2022-02-04 PROCEDURE — 97016 VASOPNEUMATIC DEVICE THERAPY: CPT | Performed by: PHYSICAL THERAPIST

## 2022-02-04 PROCEDURE — 97161 PT EVAL LOW COMPLEX 20 MIN: CPT | Performed by: PHYSICAL THERAPIST

## 2022-02-04 NOTE — FLOWSHEET NOTE
The [facility] and [address],  Sports Performance and Rehabilitation, [facility] [address]199 [address]  [address] [facility],5[address]   Robbie Wetzel  Phone: [phone]  Fax: [phone]      Physical Therapy Daily Treatment Note  Date:  2022    Patient Name:  Raven Pettit    :  1970  MRN: [id number]  Restrictions/Precautions:    Medical/Treatment Diagnosis Information:  · Diagnosis: A94.353B (ICD-10-CM) - Acute medial meniscus tear of left knee, initial nxpemftuxO51.282A (ICD-10-CM) - Acute lateral meniscus tear of left knee, initial encounter  · S/p L knee PMM, PLM DOS 22  · Treatment Diagnosis: M25.652, M25.462, R53.1, U53.7  Insurance/Certification information:  PT Insurance Information: [facility]  Physician Information:  Referring Practitioner: Nikita Morrison MD  Has the plan of care been signed (Y/N):        []  Yes  [x]  No     Date of Patient follow up with Physician: 22      Is this a Progress Report:     []  Yes  [x]  No        If Yes:  Date Range for reporting period:  Beginning 22  Ending    Progress report will be due (10 Rx or 30 days whichever is less): 41       Recertification will be due (POC Duration  / 90 days whichever is less): 22         Visit # Insurance Allowable Auth Required   IE 20 []  Yes [x]  No        Functional Scale:    Date assessed:    Give NPV with          Latex Allergy:  [x]NO      []YES  Preferred Language for Healthcare:   [x]English       []other:    Pain level:  0/10     SUBJECTIVE:  See eval    OBJECTIVE:      Flexibility L R Comment   Hamstring Mild tightness       Gastroc Mild tightness       ITB Not assessed       Quad Moderate tightness                              ROM PROM AROM Overpressure Comment     L R L R L R     Flexion 90 sheet pull   Not assessed           Extension 0   0                                                  MMT not assessed d/t recent surgery  Strength L R Comment   Quad Quad set: fair -       Hamstring         Gastroc         Hip  flexion         Hip abd                                Not assessed d/t recent surgery  Special Test Results/Comment   Meniscal Click     Crepitus     Flexion Test     Valgus Laxity     Varus Laxity     Lachmans     Drop Back     Homans -         Girth L R   Mid Patella 42.6 cm 39.5 cm   Suprapatellar 44 cm 41.5 cm   5cm above 44.5 cm 44.2 cm      Reflexes/Sensation:               []?Dermatomes/Myotomes intact               []?Reflexes equal and normal bilaterally              []?Other:     Joint mobility: Not assessed               []?Normal                      []?Hypo              []?Hyper     Palpation: Generalized TTP around surgical portals     Functional Mobility/Transfers: Decreased use of LLE d/t surgery, antalgic gait     Posture: WNL     Bandages/Dressings/Incisions:   2/4/22 Post op dressings removed in clinic. No s/s of infection. Incisions clean and dry. +bogginess at distal quad.     Gait: Arrives NWB with RW. WBAT, decreased step length, decreased toe off and heel strike, decreased knee flexion. RESTRICTIONS/PRECAUTIONS:     Exercises/Interventions:   Exercise/Equipment Resistance/Repetitions Other comments   Stretching     Hamstring 3x30\"    Towel Pull 3x30\"    Inclined Calf     Hip Flexion     ITB     Groin     Quad                    SLR     Supine Attempted- p!     Abduction     Adduction     Prone     SLR+          Isometrics     Quad sets 10x10\"         Patellar Glides     Medial     Superior     Inferior          ROM     Sheet Pulls 2x10, 5x10\"    Hang Weights     Passive     Active     Weight Shift     Ankle Pumps                    CKC     Calf raises 3x10 DL    Wall sits     Step ups     1 leg stand     Squatting     CC TKE     Balance     bridges          PRE     Extension x10 LAQ RANGE:   Flexion  RANGE:        Quantum machines     Leg press      Leg extension     Leg curl          Manual interventions                 Plan for next session: Todd, JULIO C alvarado    Therapeutic Exercise and NMR EXR  [x] (23240) Provided verbal/tactile cueing for activities related to strengthening, flexibility, endurance, ROM for improvements in LE, proximal hip, and core control with self care, mobility, lifting, ambulation.  [] (50938) Provided verbal/tactile cueing for activities related to improving balance, coordination, kinesthetic sense, posture, motor skill, proprioception  to assist with LE, proximal hip, and core control in self care, mobility, lifting, ambulation and eccentric single leg control. NMR and Therapeutic Activities:    [] (34091 or 84716) Provided verbal/tactile cueing for activities related to improving balance, coordination, kinesthetic sense, posture, motor skill, proprioception and motor activation to allow for proper function of core, proximal hip and LE with self care and ADLs  [] (25587) Gait Re-education- Provided training and instruction to the patient for proper LE, core and proximal hip recruitment and positioning and eccentric body weight control with ambulation re-education including up and down stairs     Home Exercise Program:    Access Code: [email or web address]  URL: [email or web address]  Date: 02/04/2022  Prepared by: Prakash Gardunonet     Exercises   · Seated Table Hamstring Stretch - 3 x daily - 7 x weekly - 1 sets - 3 reps - 30 second hold  · Long Sitting Calf Stretch with Strap - 3 x daily - 7 x weekly - 1 sets - 3 reps - 30 second hold  · Supine Heel Slide with Strap - 3 x daily - 7 x weekly - 1 sets - 10 reps - 10 second hold  · Supine Quad Set - 3 x daily - 7 x weekly - 1 sets - 10 reps - 10 second hold  · Standing Heel Raise - 3 x daily - 7 x weekly - 3 sets - 10 reps  · Seated Long Arc Quad - 3 x daily - 7 x weekly - 3 sets - 10 reps  [x] (11021) Reviewed/Progressed HEP activities related to strengthening, flexibility, endurance, ROM of core, proximal hip and LE for functional self-care, mobility, lifting and ambulation/stair navigation   [] (82839)Reviewed/Progressed HEP activities related to improving balance, coordination, kinesthetic sense, posture, motor skill, proprioception of core, proximal hip and LE for self care, mobility, lifting, and ambulation/stair navigation      Manual Treatments:    [] (26356) Provided manual therapy to mobilize LE, proximal hip and/or LS spine soft tissue/joints for the purpose of modulating pain, promoting relaxation,  increasing ROM, reducing/eliminating soft tissue swelling/inflammation/restriction, improving soft tissue extensibility and allowing for proper ROM for normal function with self care, mobility, lifting and ambulation. Modalities:  Vaso 15'    Charges:  Timed Code Treatment Minutes: 25   Total Treatment Minutes: 60   Time in: 1:15  Time out: 2:15    [x] EVAL (LOW) 85788 (typically 20 minutes face-to-face)  [] EVAL (MOD) 67101 (typically 30 minutes face-to-face)  [] EVAL (HIGH) 40268 (typically 45 minutes face-to-face)  [] RE-EVAL   [x] HP(31187) x 2    [] IONTO  [] NMR (40274) x     [x] VASO  [] Manual (80939) x      [] Other:  [] TA x      [] Mech Traction (39323)  [] ES(attended) (91552)      [] ES (un) (45966):     GOALS:   Patient stated goal: return to work   []? Progressing: []? Met: []? Not Met: []? Adjusted     Therapist goals for Patient:   Short Term Goals: To be achieved in: 2 weeks 2/18/22  1. Independent in HEP and progression per patient tolerance, in order to prevent re-injury. []? Progressing: []? Met: []? Not Met: []? Adjusted   2. Patient will have a decrease in pain to facilitate improvement in movement, function, and ADLs as indicated by Functional Deficits. []? Progressing: []? Met: []? Not Met: []? Adjusted    3. Patient will demonstrate reciprocal gait pattern without use of AD for improved home and community ambulation. []? Progressing: []? Met: []? Not Met: []? Adjusted      Long Term Goals: To be achieved in: 12 weeks 4/29/22  1. Disability index score of 15% or less for the LEFS to assist with reaching prior level of function. []? Progressing: []? Met: []? Not Met: []? Adjusted  2. Patient will demonstrate increased AROM to 0-140 deg or greater to allow for proper joint functioning as indicated by patients Functional Deficits. []? Progressing: []? Met: []? Not Met: []? Adjusted  3. Patient will demonstrate an increase in LLE strength to 4+/5 or greater to allow for proper functional mobility as indicated by patients Functional Deficits. []? Progressing: []? Met: []? Not Met: []? Adjusted  4. Patient will return to ADLs, IADLs and functional activities without increased symptoms or restriction. []? Progressing: []? Met: []? Not Met: []? Adjusted  5. Patient will tolerate standing >1 hour and be able to negotiate stairs with 0-1/10 pain to allow for return to work. []? Progressing: []? Met: []? Not Met: []? Adjusted         Overall Progression Towards Functional goals/ Treatment Progress Update:  [] Patient is progressing as expected towards functional goals listed. [] Progression is slowed due to complexities/Impairments listed. [] Progression has been slowed due to co-morbidities.   [x] Plan just implemented, too soon to assess goals progression <30days   [] Goals require adjustment due to lack of progress  [] Patient is not progressing as expected and requires additional follow up with physician  [] Other    Prognosis for POC: [x] Good [] Fair  [] Poor      Patient requires continued skilled intervention: [x] Yes  [] No    Treatment/Activity Tolerance:  [x] Patient able to complete treatment  [] Patient limited by fatigue  [] Patient limited by pain     [] Patient limited by other medical complications  [] Other:         PLAN: See eval  [] Continue per plan of care [] Alter current plan (see comments above)  [x] Plan of care initiated [] Hold pending MD visit [] Discharge      Electronically signed by:  Edilia Mohs, PT, DPT, OCS  Physical Therapist  [id number]  [email or web address]    Note: If patient does not return for scheduled/ recommended follow up visits, this note will serve as a discharge from care along with most recent update on progress.

## 2022-02-04 NOTE — FLOWSHEET NOTE
The [facility] and [address],  Sports Performance and Rehabilitation, [facility] [address]  [address] [facility],5Th Floor   Robbie Wetzel  Phone: [phone]  Fax: [phone]       Physical Therapy Certification    Dear Referring Practitioner: Willis Huizar MD,    We had the pleasure of evaluating the following patient for physical therapy services at [address] [place] [address]. A summary of our findings can be found in the initial assessment below. This includes our plan of care. If you have any questions or concerns regarding these findings, please do not hesitate to contact me at the office phone number checked above. Thank you for the referral.       Physician Signature:_______________________________Date:__________________  By signing above (or electronic signature), therapists plan is approved by physician      Patient: Ann Arvizu   : 1970   MRN: [id number]  Referring Physician: Referring Practitioner: Willis Huizar MD      Evaluation Date: 2022      Medical Diagnosis Information:  Diagnosis: Q96.463T (ICD-10-CM) - Acute medial meniscus tear of left knee, initial vphfexcgfH45.282A (ICD-10-CM) - Acute lateral meniscus tear of left knee, initial encounter   Treatment Diagnosis: M25.652, M25.462, R53.1, R26.2                                         Insurance information: PT Insurance Information: [facility]     Precautions/ Contra-indications:     C-SSRS Triggered by Intake questionnaire (Past 2 wk assessment):   [x] No, Questionnaire did not trigger screening.   [] Yes, Patient intake triggered further evaluation      [] C-SSRS Screening completed  [] PCP notified via Plan of Care  [] Emergency services notified     Latex Allergy:  [x]NO      []YES  Preferred Language for Healthcare:   []English       [x]other: Marshallese *needs *    SUBJECTIVE: Patient stated complaint: Pt presents to PT 2 days s/p L knee PMM/PLM.  Pt states that he is feeling good and does not have pain. He is taking the blood thinner and prescription pain medication as prescribed. Pt states that his knee started hurting 2-3 weeks ago. No MIRI. Began having pain with walking but otherwise felt fine. 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 OBJECTIVE:      Flexibility L R Comment   Hamstring Mild tightness     Gastroc Mild tightness     ITB Not assessed     Quad Moderate tightness             ROM PROM AROM Overpressure Comment    L R L R L R    Flexion 90 sheet pull  Not assessed       Extension 0  0                             MMT not assessed d/t recent surgery  Strength L R Comment   Quad Quad set: fair -     Hamstring      Gastroc      Hip  flexion      Hip abd                    Not assessed d/t recent surgery  Special Test Results/Comment   Meniscal Click    Crepitus    Flexion Test    Valgus Laxity    Varus Laxity    Lachmans    Drop Back    Homans -       Girth L R   Mid Patella 42.6 cm 39.5 cm   Suprapatellar 44 cm 41.5 cm   5cm above 44.5 cm 44.2 cm     Reflexes/Sensation:    []Dermatomes/Myotomes intact    []Reflexes equal and normal bilaterally   []Other:    Joint mobility: Not assessed    []Normal    []Hypo   []Hyper    Palpation: Generalized TTP around surgical portals    Functional Mobility/Transfers: Decreased use of LLE d/t surgery, antalgic gait    Posture: WNL    Bandages/Dressings/Incisions:   2/4/22 Post op dressings removed in clinic. No s/s of infection. Incisions clean and dry. +bogginess at distal quad.     Gait: Arrives NWB with RW. WBAT, decreased step length, decreased toe off and heel strike, decreased knee flexion. [x] Patient history, allergies, meds reviewed. Medical chart reviewed. See intake form. Review Of Systems (ROS):  [x]Performed Review of systems (Integumentary, CardioPulmonary, Neurological) by intake and observation. Intake form has been scanned into medical record. Patient has been instructed to contact their primary care physician regarding ROS issues if not already being addressed at this time. Co-morbidities/Complexities (which will affect course of rehabilitation):   [x]None           Arthritic conditions   []Rheumatoid arthritis (M05.9)  []Osteoarthritis (M19.91)   Cardiovascular conditions   []Hypertension (I10)  []Hyperlipidemia (E78.5)  []Angina pectoris (I20)  []Atherosclerosis (I70)   Musculoskeletal conditions   []Disc pathology   []Congenital spine pathologies   []Prior surgical intervention  []Osteoporosis (M81.8)  []Osteopenia (M85.8)   Endocrine conditions   []Hypothyroid (E03.9)  []Hyperthyroid Gastrointestinal conditions   []Constipation (D69.86)   Metabolic conditions   []Morbid obesity (E66.01)  []Diabetes type 1(E10.65) or 2 (E11.65)   []Neuropathy (G60.9)     Pulmonary conditions   []Asthma (J45)  []Coughing   []COPD (J44.9)   Psychological Disorders  []Anxiety (F41.9)  []Depression (F32.9)   []Other:   []Other:          Barriers to/and or personal factors that will affect rehab potential:              []Age  []Sex              [x]Motivation                       []Co-Morbidities              []Cognitive Function, education/learning barriers              []Environmental, home barriers              [x]profession/work barriers  []past PT/medical experience  []other:  Justification: Pt is motivated to get better, he does have a physical job    Falls Risk Assessment (30 days):   [x] Falls Risk assessed and no intervention required.   [] Falls Risk assessed and Patient requires intervention due to being higher risk   TUG score (>12s at risk):     [] Falls education provided, including       ASSESSMENT:   Functional Impairments:     []Noted lumbar/proximal hip/LE hypomobility   [x]Decreased LE functional ROM   [x]Decreased core/proximal hip strength and neuromuscular control   [x]Decreased LE functional strength   [x]Reduced balance/proprioceptive control   []other:      Functional Activity Limitations (from functional questionnaire and intake)   [x]Reduced ability to tolerate prolonged functional positions   [x]Reduced ability or difficulty with changes of positions or transfers between positions   [x]Reduced ability to maintain good posture and demonstrate good body mechanics with sitting, bending, and lifting   [x]Reduced ability to sleep   [x] Reduced ability or tolerance with driving and/or computer work   [x]Reduced ability to perform lifting, carrying tasks   [x]Reduced ability to squat   [x]Reduced ability to forward bend   [x]Reduced ability to ambulate prolonged functional periods/distances/surfaces   [x]Reduced ability to ascend/descend stairs   [x]Reduced ability to run, hop or jump   []other:     Participation Restrictions   [x]Reduced participation in self care activities   [x]Reduced participation in home management activities   [x]Reduced participation in work activities   [x]Reduced participation in social activities. []Reduced participation in sport activities. Classification :    [x]Signs/symptoms consistent with post-surgical status including decreased ROM, strength and function.    []Signs/symptoms consistent with joint sprain/strain   []Signs/symptoms consistent with patella-femoral syndrome   []Signs/symptoms consistent with knee OA/hip OA   []Signs/symptoms consistent with internal derangement of knee/Hip   []Signs/symptoms consistent with functional hip weakness/NMR control      []Signs/symptoms consistent with tendinitis/tendinosis    []signs/symptoms consistent with pathology which may benefit from Dry needling      []other:      Prognosis/Rehab Potential:      [x]Excellent   [x]Good    []Fair   []Poor    Tolerance of evaluation/treatment:    []Excellent   [x]Good    []Fair   []Poor    Physical Therapy Evaluation Complexity Justification  [x] A history of present problem with:  [x] no personal factors and/or comorbidities that impact the plan of care;  []1-2 personal factors and/or comorbidities that impact the plan of care  []3 personal factors and/or comorbidities that impact the plan of care  [x] An examination of body systems using standardized tests and measures addressing any of the following: body structures and functions (impairments), activity limitations, and/or participation restrictions;:  [] a total of 1-2 or more elements   [] a total of 3 or more elements   [x] a total of 4 or more elements   [x] A clinical presentation with:  [x] stable and/or uncomplicated characteristics   [] evolving clinical presentation with changing characteristics  [] unstable and unpredictable characteristics;   [x] Clinical decision making of [] low, [] moderate, [] high complexity using standardized patient assessment instrument and/or measurable assessment of functional outcome. [x] EVAL (LOW) 76826 (typically 20 minutes face-to-face)  [] EVAL (MOD) 33182 (typically 30 minutes face-to-face)  [] EVAL (HIGH) 85522 (typically 45 minutes face-to-face)  [] RE-EVAL       PLAN  Frequency/Duration:  2 days per week for 12 Weeks:  Interventions:  [x]  Therapeutic exercise including: strength training, ROM, for Lower extremity and core   [x]  NMR activation and proprioception for LE, Glutes and Core   [x]  Manual therapy as indicated for LE, Hip and spine to include: Dry Needling/IASTM, STM, PROM, Gr I-IV mobilizations, manipulation.    [x] Modalities as needed that may include: thermal agents, E-stim, Biofeedback, US, iontophoresis as indicated  [x] Patient education on joint protection, postural re-education, activity modification, progression of HEP. HEP instruction:   Access Code: [email or web address]  URL: [email or web address]  Date: 02/04/2022  Prepared by: Katie Jiang    Exercises   · Seated Table Hamstring Stretch - 3 x daily - 7 x weekly - 1 sets - 3 reps - 30 second hold  · Long Sitting Calf Stretch with Strap - 3 x daily - 7 x weekly - 1 sets - 3 reps - 30 second hold  · Supine Heel Slide with Strap - 3 x daily - 7 x weekly - 1 sets - 10 reps - 10 second hold  · Supine Quad Set - 3 x daily - 7 x weekly - 1 sets - 10 reps - 10 second hold  · Standing Heel Raise - 3 x daily - 7 x weekly - 3 sets - 10 reps  · Seated Long Arc Quad - 3 x daily - 7 x weekly - 3 sets - 10 reps    GOALS:   Patient stated goal: return to work   [] Progressing: [] Met: [] Not Met: [] Adjusted    Therapist goals for Patient:   Short Term Goals: To be achieved in: 2 weeks 2/18/22  1. Independent in HEP and progression per patient tolerance, in order to prevent re-injury. [] Progressing: [] Met: [] Not Met: [] Adjusted   2. Patient will have a decrease in pain to facilitate improvement in movement, function, and ADLs as indicated by Functional Deficits. [] Progressing: [] Met: [] Not Met: [] Adjusted    3. Patient will demonstrate reciprocal gait pattern without use of AD for improved home and community ambulation. [] Progressing: [] Met: [] Not Met: [] Adjusted     Long Term Goals: To be achieved in: 12 weeks 4/29/22  1. Disability index score of 15% or less for the LEFS to assist with reaching prior level of function. [] Progressing: [] Met: [] Not Met: [] Adjusted  2. Patient will demonstrate increased AROM to 0-140 deg or greater to allow for proper joint functioning as indicated by patients Functional Deficits. [] Progressing: [] Met: [] Not Met: [] Adjusted  3.  Patient will demonstrate an increase in LLE strength to 4+/5 or greater to allow for proper functional mobility as indicated by patients Functional Deficits. [] Progressing: [] Met: [] Not Met: [] Adjusted  4. Patient will return to ADLs, IADLs and functional activities without increased symptoms or restriction. [] Progressing: [] Met: [] Not Met: [] Adjusted  5. Patient will tolerate standing >1 hour and be able to negotiate stairs with 0-1/10 pain to allow for return to work. [] Progressing: [] Met: [] Not Met: [] Adjusted       Electronically signed by:  Kleber Sullivan PT, DPT, [facility]  Physical Therapist  [id number]  [email or web address]    Note: If patient does not return for scheduled/ recommended follow up visits, this note will serve as a discharge from care along with most recent update on progress.

## 2022-02-08 ENCOUNTER — HOSPITAL ENCOUNTER (OUTPATIENT)
Dept: PHYSICAL THERAPY | Age: 52
Setting detail: THERAPIES SERIES
Discharge: HOME | End: 2022-02-08
Payer: COMMERCIAL

## 2022-02-08 PROCEDURE — 97110 THERAPEUTIC EXERCISES: CPT | Performed by: PHYSICAL THERAPY ASSISTANT

## 2022-02-08 PROCEDURE — 97530 THERAPEUTIC ACTIVITIES: CPT | Performed by: PHYSICAL THERAPY ASSISTANT

## 2022-02-08 PROCEDURE — 97016 VASOPNEUMATIC DEVICE THERAPY: CPT | Performed by: PHYSICAL THERAPY ASSISTANT

## 2022-02-08 NOTE — FLOWSHEET NOTE
The [facility] and [address],  Sports Performance and Rehabilitation, [facility] [address]  793 [facility],5Th Floor   Robbie Wetzel  Phone: [phone]  Fax: [phone]      Physical Therapy Daily Treatment Note  Date:  2022    Patient Name:  Adriana Sevilla    :  1970  MRN: [id number]  Restrictions/Precautions:    Medical/Treatment Diagnosis Information:  Diagnosis: R15.890T (ICD-10-CM) - Acute medial meniscus tear of left knee, initial iqljrhqjxQ27.282A (ICD-10-CM) - Acute lateral meniscus tear of left knee, initial encounter  S/p L knee PMM, PLM DOS 22  Treatment Diagnosis: M25.652, M25.462, R53.1, M57.0  Insurance/Certification information:  PT Insurance Information: [facility]  Physician Information:  Referring Practitioner: Cisco Stokes MD  Has the plan of care been signed (Y/N):        []  Yes  [x]  No     Date of Patient follow up with Physician: 22  Patient seen in consultation with Dr. Jose Colon who established the initial/subsequent treatment protocol. 22: reviewed surgical findings, will continue conservative approach for OA management       Is this a Progress Report:     []  Yes  [x]  No        If Yes:  Date Range for reporting period:  Beginning 22  Ending    Progress report will be due (10 Rx or 30 days whichever is less): 2/0/10       Recertification will be due (POC Duration  / 90 days whichever is less): 22         Visit # Insurance Allowable Auth Required   IE+1   20 []  Yes [x]  No        Functional Scale:    Date assessed:    LEFS Score: 44/80=55%     22     Latex Allergy:  [x]NO      []YES  Preferred Language for Healthcare:   [x]English       []other:    Pain level:  0/10     SUBJECTIVE:  States he has been able to put weight on foot while walking. Doing well with HEP. Reports LE swelling which makes walking more difficult.     OBJECTIVE:      Flexibility L R Comment   Hamstring Mild tightness       Gastroc Mild tightness ITB Not assessed       Quad Moderate tightness                              ROM PROM AROM Overpressure Comment     L R L R L R     Flexion 110 sheet pull   Not assessed           Extension 0   0                                                  MMT not assessed d/t recent surgery  Strength L R Comment   Quad Quad set: fair -       Hamstring         Gastroc         Hip  flexion         Hip abd                                Not assessed d/t recent surgery  Special Test Results/Comment   Meniscal Click     Crepitus     Flexion Test     Valgus Laxity     Varus Laxity     Lachmans     Drop Back     Homans -         Girth L R   Mid Patella 42.6 cm 39.5 cm   Suprapatellar 44 cm 41.5 cm   5cm above 44.5 cm 44.2 cm      Reflexes/Sensation:               []Dermatomes/Myotomes intact               []Reflexes equal and normal bilaterally              []Other:     Joint mobility: Not assessed               []Normal                      []Hypo              []Hyper     Palpation: Generalized TTP around surgical portals     Functional Mobility/Transfers: Decreased use of LLE d/t surgery, antalgic gait     Posture: WNL     Bandages/Dressings/Incisions:   2/4/22 Post op dressings removed in clinic. No s/s of infection. Incisions clean and dry. +bogginess at distal quad. Gait: Arrives PWB with RW. WBAT, decreased step length, decreased toe off and heel strike, decreased knee flexion.        RESTRICTIONS/PRECAUTIONS:     Exercises/Interventions:   Exercise/Equipment Resistance/Repetitions Other comments   Stretching     Hamstring 5x30\"    Towel Pull 5x30\"    Inclined Calf     Hip Flexion     ITB     Groin     Quad                    SLR     Supine 2x10    Abduction 2x10    Adduction     Prone     SLR+          Isometrics     Quad sets 10x10\"         Patellar Glides     Medial     Superior     Inferior          ROM     Sheet Pulls  5x10\"    Hang Weights     Passive     Active     Weight Shift     Ankle Pumps CKC     Calf raises 3x10 DL    Wall sits     Step ups     1 leg stand     Squatting     CC TKE     Balance     bridges          PRE     Extension 2x10 LAQ RANGE:   Flexion  RANGE:        Quantum machines     Leg press      Leg extension     Leg curl          Manual interventions                 Plan for next session: JULIO C alvarado    Therapeutic Exercise and NMR EXR  [x] (07942) Provided verbal/tactile cueing for activities related to strengthening, flexibility, endurance, ROM for improvements in LE, proximal hip, and core control with self care, mobility, lifting, ambulation. [x] (44871) Provided verbal/tactile cueing for activities related to improving balance, coordination, kinesthetic sense, posture, motor skill, proprioception  to assist with LE, proximal hip, and core control in self care, mobility, lifting, ambulation and eccentric single leg control. NMR and Therapeutic Activities:    [x] (90344 or 71085) Provided verbal/tactile cueing for activities related to improving balance, coordination, kinesthetic sense, posture, motor skill, proprioception and motor activation to allow for proper function of core, proximal hip and LE with self care and ADLs  [x] (39153) Gait Re-education- Provided training and instruction to the patient for proper LE, core and proximal hip recruitment and positioning and eccentric body weight control with ambulation re-education including up and down stairs     Home Exercise Program:    Access Code: [email or web address]  URL: [email or web address]  Date: 02/04/2022  Prepared by: Baby Base     Exercises   Seated Table Hamstring Stretch - 3 x daily - 7 x weekly - 1 sets - 3 reps - 30 second hold  Long Sitting Calf Stretch with Strap - 3 x daily - 7 x weekly - 1 sets - 3 reps - 30 second hold  Supine Heel Slide with Strap - 3 x daily - 7 x weekly - 1 sets - 10 reps - 10 second hold  Supine Quad Set - 3 x daily - 7 x weekly - 1 sets - 10 reps - 10 second hold  Standing Heel Raise - 3 x Progressing: [] Met: [] Not Met: [] Adjusted    3. Patient will demonstrate reciprocal gait pattern without use of AD for improved home and community ambulation. [] Progressing: [] Met: [] Not Met: [] Adjusted      Long Term Goals: To be achieved in: 12 weeks 4/29/22  1. Disability index score of 15% or less for the LEFS to assist with reaching prior level of function. [] Progressing: [] Met: [] Not Met: [] Adjusted  2. Patient will demonstrate increased AROM to 0-140 deg or greater to allow for proper joint functioning as indicated by patients Functional Deficits. [] Progressing: [] Met: [] Not Met: [] Adjusted  3. Patient will demonstrate an increase in LLE strength to 4+/5 or greater to allow for proper functional mobility as indicated by patients Functional Deficits. [] Progressing: [] Met: [] Not Met: [] Adjusted  4. Patient will return to ADLs, IADLs and functional activities without increased symptoms or restriction. [] Progressing: [] Met: [] Not Met: [] Adjusted  5. Patient will tolerate standing >1 hour and be able to negotiate stairs with 0-1/10 pain to allow for return to work. [] Progressing: [] Met: [] Not Met: [] Adjusted         Overall Progression Towards Functional goals/ Treatment Progress Update:  [] Patient is progressing as expected towards functional goals listed. [] Progression is slowed due to complexities/Impairments listed. [] Progression has been slowed due to co-morbidities.   [x] Plan just implemented, too soon to assess goals progression <30days   [] Goals require adjustment due to lack of progress  [] Patient is not progressing as expected and requires additional follow up with physician  [] Other    Prognosis for POC: [x] Good [] Fair  [] Poor      Patient requires continued skilled intervention: [x] Yes  [] No    Treatment/Activity Tolerance:  [x] Patient able to complete treatment  [] Patient limited by fatigue  [] Patient limited by pain     [] Patient limited by other medical complications  [x] Other: Interpretor present for visit. Pt did well with current program. Reached 110° with KF ROM. Able to perform independent SLR today. PLAN: See eval  [x] Continue per plan of care [] Alter current plan (see comments above)  [] Plan of care initiated [] Hold pending MD visit [] Discharge  Gait training    Electronically signed by:  Sid Velez PTA    Note: If patient does not return for scheduled/ recommended follow up visits, this note will serve as a discharge from care along with most recent update on progress.

## 2022-02-11 ENCOUNTER — HOSPITAL ENCOUNTER (OUTPATIENT)
Dept: PHYSICAL THERAPY | Age: 52
Setting detail: THERAPIES SERIES
Discharge: HOME | End: 2022-02-11
Payer: COMMERCIAL

## 2022-02-11 PROCEDURE — 97110 THERAPEUTIC EXERCISES: CPT | Performed by: PHYSICAL THERAPIST

## 2022-02-11 PROCEDURE — 97530 THERAPEUTIC ACTIVITIES: CPT | Performed by: PHYSICAL THERAPIST

## 2022-02-11 PROCEDURE — 97016 VASOPNEUMATIC DEVICE THERAPY: CPT | Performed by: PHYSICAL THERAPIST

## 2022-02-11 NOTE — FLOWSHEET NOTE
The [facility] and [address],  Sports Performance and Rehabilitation, [facility], [facility] [address]  793 [facility],5[address] Floor   [place], Robbie [place] Hyun  Phone: [phone]  Fax: [phone]      Physical Therapy Daily Treatment Note  Date:  2022    Patient Name:  Kam Pearson    :  1970  MRN: [id number]  Restrictions/Precautions:    Medical/Treatment Diagnosis Information:  · Diagnosis: S83.242A (ICD-10-CM) - Acute medial meniscus tear of left knee, initial saaxroeruY28.282A (ICD-10-CM) - Acute lateral meniscus tear of left knee, initial encounter  · S/p L knee PMM, PLM DOS 22  · Treatment Diagnosis: M25.652, M25.462, R53.1, Q61.6  Insurance/Certification information:  PT Insurance Information: [facility]  Physician Information:  Referring Practitioner: Angelica Mccray MD  Has the plan of care been signed (Y/N):        []  Yes  [x]  No     Date of Patient follow up with Physician: 22  Patient seen in consultation with Dr. Lis Ulrich who established the initial/subsequent treatment protocol. 22: reviewed surgical findings, will continue conservative approach for OA management       Is this a Progress Report:     []  Yes  [x]  No        If Yes:  Date Range for reporting period:  Beginning 22  Ending    Progress report will be due (10 Rx or 30 days whichever is less): 25       Recertification will be due (POC Duration  / 90 days whichever is less): 22         Visit # Insurance Allowable Auth Required   IE+2   20 []  Yes [x]  No        Functional Scale:    Date assessed:    LEFS Score: 44/80=55%     22     Latex Allergy:  [x]NO      []YES  Preferred Language for Healthcare:   [x]English       []other:    Pain level:  0/10     SUBJECTIVE: Pt states that there is a little bit of lateral knee swelling and pain. Ice helps decrease swelling and pain.       OBJECTIVE:      Flexibility L R Comment   Hamstring Mild tightness       Gastroc Mild tightness       ITB Not assessed       Quad Moderate tightness                              ROM PROM AROM Overpressure Comment     L R L R L R     Flexion 122 ERMI    Not assessed           Extension 0   0                                                  MMT not assessed d/t recent surgery  Strength L R Comment   Quad Quad set: fair -       Hamstring         Gastroc         Hip  flexion         Hip abd                                Not assessed d/t recent surgery  Special Test Results/Comment   Meniscal Click     Crepitus     Flexion Test     Valgus Laxity     Varus Laxity     Lachmans     Drop Back     Homans -         Girth L R   Mid Patella 42.6 cm 39.5 cm   Suprapatellar 44 cm 41.5 cm   5cm above 44.5 cm 44.2 cm      Reflexes/Sensation:               []Dermatomes/Myotomes intact               []Reflexes equal and normal bilaterally              []Other:     Joint mobility: Not assessed               []Normal                      []Hypo              []Hyper     Palpation: Generalized TTP around surgical portals     Functional Mobility/Transfers: Decreased use of LLE d/t surgery, antalgic gait     Posture: WNL     Bandages/Dressings/Incisions:   2/4/22 Post op dressings removed in clinic. No s/s of infection. Incisions clean and dry. +bogginess at distal quad. Gait: Arrives PWB with RW. WBAT, decreased step length, decreased toe off and heel strike, decreased knee flexion.        RESTRICTIONS/PRECAUTIONS:     Exercises/Interventions:   Exercise/Equipment Resistance/Repetitions Other comments   Stretching     Hamstring 5x30\"    Towel Pull 5x30\"    Inclined Calf     Hip Flexion     ITB     Groin     Quad     ERMI  5x30\"              SLR     Supine 1x10    Abduction 1x10    Adduction     Prone     SLR+          Isometrics     Quad sets 10x10\"         Patellar Glides     Medial     Superior     Inferior          ROM     Sheet Pulls    Hang Weights     Passive     Active     Weight Shift     Ankle Pumps     Bike  5' Seat 10 CKC     Calf raises     Wall sits     Step ups     1 leg stand     Squatting- Mini 2x10    CC TKE 10x10\"    Balance     bridges          PRE     Extension  RANGE:   Flexion  RANGE:        Quantum machines     Leg press      Leg extension     Leg curl          Manual interventions                 Plan for next session: leg press    Therapeutic Exercise and NMR EXR  [x] (91920) Provided verbal/tactile cueing for activities related to strengthening, flexibility, endurance, ROM for improvements in LE, proximal hip, and core control with self care, mobility, lifting, ambulation. [x] (03444) Provided verbal/tactile cueing for activities related to improving balance, coordination, kinesthetic sense, posture, motor skill, proprioception  to assist with LE, proximal hip, and core control in self care, mobility, lifting, ambulation and eccentric single leg control. NMR and Therapeutic Activities:    [x] (57349 or 36798) Provided verbal/tactile cueing for activities related to improving balance, coordination, kinesthetic sense, posture, motor skill, proprioception and motor activation to allow for proper function of core, proximal hip and LE with self care and ADLs  [x] (98481) Gait Re-education- Provided training and instruction to the patient for proper LE, core and proximal hip recruitment and positioning and eccentric body weight control with ambulation re-education including up and down stairs     Home Exercise Program:    Access Code: [email or web address]  URL: [email or web address]  Date: 02/11/2022  Prepared by: Yajaira Hamilton    Exercises  · Seated Table Hamstring Stretch - 3 x daily - 7 x weekly - 1 sets - 3 reps - 30 second hold  · Long Sitting Calf Stretch with Strap - 3 x daily - 7 x weekly - 1 sets - 3 reps - 30 second hold  · Supine Heel Slide with Strap - 3 x daily - 7 x weekly - 1 sets - 10 reps - 10 second hold  · Supine Quad Set - 3 x daily - 7 x weekly - 1 sets - 10 reps - 10 second hold  · Standing Heel Raise - 3 x daily - 7 x weekly - 3 sets - 10 reps  · Seated Long Arc Quad - 3 x daily - 7 x weekly - 3 sets - 10 reps  · Mini Squat with Counter Support - 2 x daily - 5 x weekly - 3 sets - 10 reps  · Supine Active Straight Leg Raise - 3 x daily - 7 x weekly - 3 sets - 10 reps      [x] (49283) Reviewed/Progressed HEP activities related to strengthening, flexibility, endurance, ROM of core, proximal hip and LE for functional self-care, mobility, lifting and ambulation/stair navigation   [x] (40056)Reviewed/Progressed HEP activities related to improving balance, coordination, kinesthetic sense, posture, motor skill, proprioception of core, proximal hip and LE for self care, mobility, lifting, and ambulation/stair navigation      Manual Treatments:    [x] (64456) Provided manual therapy to mobilize LE, proximal hip and/or LS spine soft tissue/joints for the purpose of modulating pain, promoting relaxation,  increasing ROM, reducing/eliminating soft tissue swelling/inflammation/restriction, improving soft tissue extensibility and allowing for proper ROM for normal function with self care, mobility, lifting and ambulation. Modalities:  Vaso 15'    Charges:  Timed Code Treatment Minutes: 65   Total Treatment Minutes: 65   Time in: 8:30  Time out: 9:35    [] EVAL (LOW) 61915 (typically 20 minutes face-to-face)  [] EVAL (MOD) 51158 (typically 30 minutes face-to-face)  [] EVAL (HIGH) 33925 (typically 45 minutes face-to-face)  [] RE-EVAL   [x] BM(84525) x 2    [] IONTO  [] NMR (46922) x     [x] VASO  [] Manual (88345) x      [] Other:  [x] TA x 1     [] Mech Traction (73745)  [] ES(attended) (18048)      [] ES (un) (27301):     GOALS:   Patient stated goal: return to work   [] Progressing: [] Met: [] Not Met: [] Adjusted     Therapist goals for Patient:   Short Term Goals: To be achieved in: 2 weeks 2/18/22  1. Independent in HEP and progression per patient tolerance, in order to prevent re-injury.      [] Progressing: [] Met: [] Not Met: [] Adjusted   2. Patient will have a decrease in pain to facilitate improvement in movement, function, and ADLs as indicated by Functional Deficits. [] Progressing: [] Met: [] Not Met: [] Adjusted    3. Patient will demonstrate reciprocal gait pattern without use of AD for improved home and community ambulation. [] Progressing: [] Met: [] Not Met: [] Adjusted      Long Term Goals: To be achieved in: 12 weeks 4/29/22  1. Disability index score of 15% or less for the LEFS to assist with reaching prior level of function. [] Progressing: [] Met: [] Not Met: [] Adjusted  2. Patient will demonstrate increased AROM to 0-140 deg or greater to allow for proper joint functioning as indicated by patients Functional Deficits. [] Progressing: [] Met: [] Not Met: [] Adjusted  3. Patient will demonstrate an increase in LLE strength to 4+/5 or greater to allow for proper functional mobility as indicated by patients Functional Deficits. [] Progressing: [] Met: [] Not Met: [] Adjusted  4. Patient will return to ADLs, IADLs and functional activities without increased symptoms or restriction. [] Progressing: [] Met: [] Not Met: [] Adjusted  5. Patient will tolerate standing >1 hour and be able to negotiate stairs with 0-1/10 pain to allow for return to work. [] Progressing: [] Met: [] Not Met: [] Adjusted         Overall Progression Towards Functional goals/ Treatment Progress Update:  [] Patient is progressing as expected towards functional goals listed. [] Progression is slowed due to complexities/Impairments listed. [] Progression has been slowed due to co-morbidities.   [x] Plan just implemented, too soon to assess goals progression <30days   [] Goals require adjustment due to lack of progress  [] Patient is not progressing as expected and requires additional follow up with physician  [] Other    Prognosis for POC: [x] Good [] Fair  [] Poor      Patient requires continued skilled intervention: [x]

## 2022-02-15 ENCOUNTER — HOSPITAL ENCOUNTER (OUTPATIENT)
Dept: PHYSICAL THERAPY | Age: 52
Setting detail: THERAPIES SERIES
Discharge: HOME | End: 2022-02-15
Payer: COMMERCIAL

## 2022-02-15 NOTE — FLOWSHEET NOTE
The [facility] and [address],  Sports Performance and Rehabilitation, [facility] [address]  793 [facility],5Th Floor   Robbie Wetzel  Phone: [phone]  Fax: [phone]      Physical Therapy  Cancellation/No-show Note  Patient Name:  Ann Arvizu  :  1970   Date:  2/15/2022  Cancelled visits to date: 0  No-shows to date: 1    For today's appointment patient:  []  Cancelled  []  Rescheduled appointment  [x]  No-show     Reason given by patient:  []  Patient ill  []  Conflicting appointment   []  No transportation    []  Conflict with work  [x]  No reason given  []  Other:     Comments:      Electronically signed by:  Nguyễn Worley PT, DPT, OCS  Physical Therapist  [id number]  [email or web address]    Lia Palencia, RUST  Therapist was present, directed the patient's care, made skilled judgement, and was responsible for assessment and treatment of the patient.

## 2022-02-16 ENCOUNTER — HOSPITAL ENCOUNTER (OUTPATIENT)
Dept: PHYSICAL THERAPY | Age: 52
Setting detail: THERAPIES SERIES
Discharge: HOME | End: 2022-02-16
Payer: COMMERCIAL

## 2022-02-16 PROCEDURE — 97110 THERAPEUTIC EXERCISES: CPT | Performed by: PHYSICAL THERAPIST

## 2022-02-16 PROCEDURE — 97140 MANUAL THERAPY 1/> REGIONS: CPT | Performed by: PHYSICAL THERAPIST

## 2022-02-16 NOTE — FLOWSHEET NOTE
[facility] and [address],  Sports Performance and Rehabilitation, [facility] [address] 1246 [address]  [address] [facility],5[address]   Robbie Wetzel  Phone: [phone]  Fax: [phone]      Physical Therapy Daily Treatment Note  Date:  2022    Patient Name:  Jay Bangura    :  1970  MRN: [id number]  Restrictions/Precautions:    Medical/Treatment Diagnosis Information:  · Diagnosis: W20.587H (ICD-10-CM) - Acute medial meniscus tear of left knee, initial [id number] (ICD-10-CM) - Acute lateral meniscus tear of left knee, initial encounter  · S/p L knee PMM, PLM DOS 22  · Treatment Diagnosis: M25.652, M25.462, R53.1, W20.1  Insurance/Certification information:  PT Insurance Information: [facility]  Physician Information:  Referring Practitioner: Glenn Putnam MD  Has the plan of care been signed (Y/N):        []  Yes  [x]  No     Date of Patient follow up with Physician: 22  Patient seen in consultation with Dr. Ramon Drew who established the initial/subsequent treatment protocol. 22: reviewed surgical findings, will continue conservative approach for OA management       Is this a Progress Report:     []  Yes  [x]  No        If Yes:  Date Range for reporting period:  Beginning 22  Ending    Progress report will be due (10 Rx or 30 days whichever is less): 3/1/80       Recertification will be due (POC Duration  / 90 days whichever is less): 22         Visit # Insurance Allowable Auth Required   IE+3   20 []  Yes [x]  No        Functional Scale:    Date assessed:    LEFS Score: 44=55%     22     Latex Allergy:  [x]NO      []YES  Preferred Language for Healthcare:   [x]English       []other:    Pain level:  2/10     SUBJECTIVE: Pt states that he is feeling much better but walking is still difficult. Pt has pain at the medial ankle with minor swelling and states that pain/swelling was there prior to knee surgery. Walking is painful because of the ankle. Pt states that he sprained his ankle December 20th, 2021. OBJECTIVE:      Flexibility L R Comment   Hamstring Mild tightness       Gastroc Mild tightness       ITB Not assessed       Quad Moderate tightness                              ROM PROM AROM Overpressure Comment     L R L R L R     Flexion 127 ERMI    Not assessed           Extension 0   0                                                  MMT not assessed d/t recent surgery  Strength L R Comment   Quad Quad set: fair -       Hamstring         Gastroc         Hip  flexion         Hip abd                                Not assessed d/t recent surgery  Special Test Results/Comment   Meniscal Click     Crepitus     Flexion Test     Valgus Laxity     Varus Laxity     Lachmans     Drop Back     Homans -         Girth L R   Mid Patella 42.6 cm 39.5 cm   Suprapatellar 44 cm 41.5 cm   5cm above 44.5 cm 44.2 cm      Reflexes/Sensation:               []Dermatomes/Myotomes intact               []Reflexes equal and normal bilaterally              []Other:     Joint mobility: Not assessed               []Normal                      []Hypo              []Hyper     Palpation: Generalized TTP around surgical portals     Functional Mobility/Transfers: Decreased use of LLE d/t surgery, antalgic gait     Posture: WNL     Bandages/Dressings/Incisions:   2/4/22 Post op dressings removed in clinic. No s/s of infection. Incisions clean and dry. +bogginess at distal quad. Gait: Arrives PWB with RW. WBAT, decreased step length, decreased toe off and heel strike, decreased knee flexion.        RESTRICTIONS/PRECAUTIONS:     Exercises/Interventions:   Exercise/Equipment Resistance/Repetitions Other comments   Stretching     Hamstring 5x30\"    Towel Pull 5x30\"    Inclined Calf     Hip Flexion     ITB     Groin     Quad     ERMI  5x30\"    Standing toe extension HEP    Standing Gastroc HEP    Ankle circles 1x10 CW, CCW         SLR     Supine    Abduction    Adduction     Prone SLR+          Isometrics     Quad sets 10x10\"         Patellar Glides     Medial     Superior     Inferior          ROM     Sheet Pulls    Hang Weights     Passive     Active     Weight Shift     Ankle Pumps     Bike  5' Seat 9  Res 1.5              CKC     Calf raises     Wall sits     Step ups     1 leg stand     Squatting- Mini 3x10    CC TKE 10x10\" green    Balance     bridges          PRE     Extension  RANGE:   Flexion  RANGE:   4 way ankle  Attempted, d/c'd due to limited mobility          Quantum machines     Leg press  3x10 DL 80#    Leg extension     Leg curl          Manual interventions     IASTM 12' post tib, medial ankle  HG 9 strumming, brushing, stroking            Plan for next session: leg press    Therapeutic Exercise and NMR EXR  [x] (56264) Provided verbal/tactile cueing for activities related to strengthening, flexibility, endurance, ROM for improvements in LE, proximal hip, and core control with self care, mobility, lifting, ambulation. [x] (40488) Provided verbal/tactile cueing for activities related to improving balance, coordination, kinesthetic sense, posture, motor skill, proprioception  to assist with LE, proximal hip, and core control in self care, mobility, lifting, ambulation and eccentric single leg control. NMR and Therapeutic Activities:    [x] (01418 or 72808) Provided verbal/tactile cueing for activities related to improving balance, coordination, kinesthetic sense, posture, motor skill, proprioception and motor activation to allow for proper function of core, proximal hip and LE with self care and ADLs  [x] (53014) Gait Re-education- Provided training and instruction to the patient for proper LE, core and proximal hip recruitment and positioning and eccentric body weight control with ambulation re-education including up and down stairs     Home Exercise Program:    Access Code: [email or web address]  URL: [email or web address]  Date: 02/16/2022  Prepared by: Rosita Obrien Adriana Ely    Exercises  · Seated Table Hamstring Stretch - 3 x daily - 7 x weekly - 1 sets - 3 reps - 30 second hold  · Long Sitting Calf Stretch with Strap - 3 x daily - 7 x weekly - 1 sets - 3 reps - 30 second hold  · Supine Heel Slide with Strap - 3 x daily - 7 x weekly - 1 sets - 10 reps - 10 second hold  · Supine Quad Set - 3 x daily - 7 x weekly - 1 sets - 10 reps - 10 second hold  · Standing Heel Raise - 3 x daily - 7 x weekly - 3 sets - 10 reps  · Seated Long Arc Quad - 3 x daily - 7 x weekly - 3 sets - 10 reps  · Mini Squat with Counter Support - 2 x daily - 5 x weekly - 3 sets - 10 reps  · Supine Active Straight Leg Raise - 3 x daily - 7 x weekly - 3 sets - 10 reps  · Supine Ankle Circles - 3 x daily - 7 x weekly - 3 sets - 10 reps  · Standing Toe Dorsiflexion Stretch - 3 x daily - 7 x weekly - 1 sets - 3 reps - 30 sec hold  · Standing Gastroc Stretch - 3 x daily - 7 x weekly - 1 sets - 3 reps - 30 sec hold    [x] (97892) Reviewed/Progressed HEP activities related to strengthening, flexibility, endurance, ROM of core, proximal hip and LE for functional self-care, mobility, lifting and ambulation/stair navigation   [x] (37306)Reviewed/Progressed HEP activities related to improving balance, coordination, kinesthetic sense, posture, motor skill, proprioception of core, proximal hip and LE for self care, mobility, lifting, and ambulation/stair navigation      Manual Treatments:    [x] (05865) Provided manual therapy to mobilize LE, proximal hip and/or LS spine soft tissue/joints for the purpose of modulating pain, promoting relaxation,  increasing ROM, reducing/eliminating soft tissue swelling/inflammation/restriction, improving soft tissue extensibility and allowing for proper ROM for normal function with self care, mobility, lifting and ambulation.      Modalities:  Vaso 15' knee + ankle    Charges:  Timed Code Treatment Minutes: 60   Total Treatment Minutes: 75   Time in: 9:22  Time out: 10:50    [] EVAL (LOW) 76121 (typically 20 minutes face-to-face)  [] EVAL (MOD) 44560 (typically 30 minutes face-to-face)  [] EVAL (HIGH) 52585 (typically 45 minutes face-to-face)  [] RE-EVAL   [x] WC(66927) x 3    [] IONTO  [] NMR (29656) x     [x] VASO  [x] Manual (96905) x 1     [] Other:  [] TA x      [] Mech Traction (00712)  [] ES(attended) (11715)      [] ES (un) (58994):     GOALS:   Patient stated goal: return to work   [] Progressing: [] Met: [] Not Met: [] Adjusted     Therapist goals for Patient:   Short Term Goals: To be achieved in: 2 weeks 2/18/22  1. Independent in HEP and progression per patient tolerance, in order to prevent re-injury. [] Progressing: [] Met: [] Not Met: [] Adjusted   2. Patient will have a decrease in pain to facilitate improvement in movement, function, and ADLs as indicated by Functional Deficits. [] Progressing: [] Met: [] Not Met: [] Adjusted    3. Patient will demonstrate reciprocal gait pattern without use of AD for improved home and community ambulation. [] Progressing: [] Met: [] Not Met: [] Adjusted      Long Term Goals: To be achieved in: 12 weeks 4/29/22  1. Disability index score of 15% or less for the LEFS to assist with reaching prior level of function. [] Progressing: [] Met: [] Not Met: [] Adjusted  2. Patient will demonstrate increased AROM to 0-140 deg or greater to allow for proper joint functioning as indicated by patients Functional Deficits. [] Progressing: [] Met: [] Not Met: [] Adjusted  3. Patient will demonstrate an increase in LLE strength to 4+/5 or greater to allow for proper functional mobility as indicated by patients Functional Deficits. [] Progressing: [] Met: [] Not Met: [] Adjusted  4. Patient will return to ADLs, IADLs and functional activities without increased symptoms or restriction. [] Progressing: [] Met: [] Not Met: [] Adjusted  5. Patient will tolerate standing >1 hour and be able to negotiate stairs with 0-1/10 pain to allow for return to work. [] Progressing: [] Met: [] Not Met: [] Adjusted         Overall Progression Towards Functional goals/ Treatment Progress Update:  [] Patient is progressing as expected towards functional goals listed. [] Progression is slowed due to complexities/Impairments listed. [] Progression has been slowed due to co-morbidities. [x] Plan just implemented, too soon to assess goals progression <30days   [] Goals require adjustment due to lack of progress  [] Patient is not progressing as expected and requires additional follow up with physician  [] Other    Prognosis for POC: [x] Good [] Fair  [] Poor      Patient requires continued skilled intervention: [x] Yes  [] No    Treatment/Activity Tolerance:  [x] Patient able to complete treatment  [] Patient limited by fatigue  [] Patient limited by pain     [] Patient limited by other medical complications  [x] Other: Pt reported tenderness to the medial ankle during IASTM of the post tib. Pt had grittiness in the muscle belly of the post tib and mild erythema present after 3-4 strokes were performed. Pt stated that closer to the ankle is where he has been having pain during ambulation. 4 way ankle strengthening was attempted but pt lacked the appropriate amount of motion to utilize a TB. Pt was then given ankle circles to increase mobility and movement at the ankle joint. Pt demonstrated  proper form with ankle circles, gastroc stretch and toe extension stretching. Pt tolerated introduction to leg press well with no increase in pain. Pt has improved tolerance to bike, able to make full revolution with seat 1 notch closer to pedals, improved speed without pain. Pt demonstrates improved knee flex on ERMI from 122 to 127. Pt required initial cueing on mini squats to increase hip hinge VC to \"tap butt to chair\" pt then able to perform with good form. Pt requires further PT follow up to address strength, ROM and functional deficits.          PLAN: See eval  [x] Continue per plan of care [] Alter current plan (see comments above)  [] Plan of care initiated [] Hold pending MD visit [] Discharge  Gait training    Electronically signed by:  Nguyễn Worley, PT DPT, OCS  Physical Therapist  [email or web address]  [email or web address]    Lia Palencia, [facility]  Therapist was present, directed the patient's care, made skilled judgement, and was responsible for assessment and treatment of the patient. Note: If patient does not return for scheduled/ recommended follow up visits, this note will serve as a discharge from care along with most recent update on progress.

## 2022-02-18 ENCOUNTER — HOSPITAL ENCOUNTER (OUTPATIENT)
Dept: PHYSICAL THERAPY | Age: 52
Setting detail: THERAPIES SERIES
Discharge: HOME | End: 2022-02-18
Payer: COMMERCIAL

## 2022-02-18 PROCEDURE — 97016 VASOPNEUMATIC DEVICE THERAPY: CPT | Performed by: PHYSICAL THERAPIST

## 2022-02-18 PROCEDURE — 97110 THERAPEUTIC EXERCISES: CPT | Performed by: PHYSICAL THERAPIST

## 2022-02-18 PROCEDURE — 97140 MANUAL THERAPY 1/> REGIONS: CPT | Performed by: PHYSICAL THERAPIST

## 2022-02-18 NOTE — FLOWSHEET NOTE
The [facility] and [address],  Sports [facility] and Rehabilitation, Formerly [facility] [address]  [address] [facility],5[address]   Robbie Wetzel  Phone: [phone]  Fax: [phone]      Physical Therapy Daily Treatment Note  Date:  2022    Patient Name:  Abe Kaur    :  1970  MRN: [id number]  Restrictions/Precautions:    Medical/Treatment Diagnosis Information:  · Diagnosis: L18.865I (ICD-10-CM) - Acute medial meniscus tear of left knee, initial [id number] (ICD-10-CM) - Acute lateral meniscus tear of left knee, initial encounter  · S/p L knee PMM, PLM DOS 22  · Treatment Diagnosis: M25.652, M25.462, R53.1, K14.4  Insurance/Certification information:  PT Insurance Information: [facility]  Physician Information:  Referring Practitioner: Estela Pratt MD  Has the plan of care been signed (Y/N):        []  Yes  [x]  No     Date of Patient follow up with Physician: 22  Patient seen in consultation with Dr. Yesi Chambers who established the initial/subsequent treatment protocol. 22: reviewed surgical findings, will continue conservative approach for OA management       Is this a Progress Report:     []  Yes  [x]  No        If Yes:  Date Range for reporting period:  Beginning 22  Ending    Progress report will be due (10 Rx or 30 days whichever is less): 39       Recertification will be due (POC Duration  / 90 days whichever is less): 22         Visit # Insurance Allowable Auth Required   IE+4   20 []  Yes [x]  No        Functional Scale:    Date assessed:    LEFS Score: 44/80=55%     22     Latex Allergy:  [x]NO      []YES  Preferred Language for Healthcare:   [x]English       []other:    Pain level:  2/10     SUBJECTIVE: 2.5 weeks po. Pt states that the IASTM helped his ankle feel better and has been walking better. Pt still c/o lateral knee pain/discomfort.      OBJECTIVE:      Flexibility L R Comment   Hamstring Mild tightness       Gastroc Mild tightness       ITB Not assessed       Quad Moderate tightness                              ROM PROM AROM Overpressure Comment     L R L R L R     Flexion 129 ERMI    Not assessed           Extension 0   0                                                  MMT not assessed d/t recent surgery  Strength L R Comment   Quad Quad set: fair -       Hamstring         Gastroc         Hip  flexion         Hip abd                                Not assessed d/t recent surgery  Special Test Results/Comment   Meniscal Click     Crepitus     Flexion Test     Valgus Laxity     Varus Laxity     Lachmans     Drop Back     Homans -         Girth L R   Mid Patella 42.6 cm 39.5 cm   Suprapatellar 44 cm 41.5 cm   5cm above 44.5 cm 44.2 cm      Reflexes/Sensation:               []Dermatomes/Myotomes intact               []Reflexes equal and normal bilaterally              []Other:     Joint mobility: Not assessed               []Normal                      []Hypo              []Hyper     Palpation: Generalized TTP around surgical portals     Functional Mobility/Transfers: Decreased use of LLE d/t surgery, antalgic gait     Posture: WNL     Bandages/Dressings/Incisions:   2/4/22 Post op dressings removed in clinic. No s/s of infection. Incisions clean and dry. +bogginess at distal quad. Gait:   2/4/22 Arrives PWB with RW. WBAT, decreased step length, decreased toe off and heel strike, decreased knee flexion. 2/18/22 FWB without AD. Flat foot contact with LLE, decreased stance time, decreased stride length, mild shuffling of feet.       RESTRICTIONS/PRECAUTIONS:     Exercises/Interventions:   Exercise/Equipment Resistance/Repetitions Other comments   Stretching     Hamstring 3x30\"    Towel Pull 3x30\"    Inclined Calf     Hip Flexion     ITB     Groin     Quad     ERMI  5x30\"    Standing toe extension HEP    Standing Gastroc HEP    Ankle circles/AROM 1x10 CW, CCW, inv, eversion, PF AROM         SLR     Supine    Abduction 3x10 Adduction     Prone     SLR+          Isometrics     Quad sets          Patellar Glides     Medial     Superior     Inferior          ROM     Sheet Pulls    Hang Weights     Passive     Active     Weight Shift     Ankle Pumps     Bike   Seat 9  Res 1.5              CKC     Calf raises     Wall sits     Step ups     1 leg stand     Squatting- Mini 3x10 TRX    CC TKE 10x10\" blue    Balance     bridges          PRE     Extension 3x10 0,3,5# LAQ RANGE:   Flexion  RANGE:   4 way ankle  Attempted, d/c'd due to limited mobility          Quantum machines     Leg press  3x10 #    Leg extension  NPV   Leg curl  NPV        Manual interventions     IASTM 8' post tib, medial ankle  HG 9 strumming, brushing, stroking            Plan for next session: leg press    Therapeutic Exercise and NMR EXR  [x] (87206) Provided verbal/tactile cueing for activities related to strengthening, flexibility, endurance, ROM for improvements in LE, proximal hip, and core control with self care, mobility, lifting, ambulation. [x] (63660) Provided verbal/tactile cueing for activities related to improving balance, coordination, kinesthetic sense, posture, motor skill, proprioception  to assist with LE, proximal hip, and core control in self care, mobility, lifting, ambulation and eccentric single leg control. NMR and Therapeutic Activities:    [x] (90496 or 42516) Provided verbal/tactile cueing for activities related to improving balance, coordination, kinesthetic sense, posture, motor skill, proprioception and motor activation to allow for proper function of core, proximal hip and LE with self care and ADLs  [x] (33448) Gait Re-education- Provided training and instruction to the patient for proper LE, core and proximal hip recruitment and positioning and eccentric body weight control with ambulation re-education including up and down stairs     Home Exercise Program:    Access Code: [email or web address]  URL: [email or web address]  Date: 02/16/2022  Prepared by: Lee Xie    Exercises  · Seated Table Hamstring Stretch - 3 x daily - 7 x weekly - 1 sets - 3 reps - 30 second hold  · Long Sitting Calf Stretch with Strap - 3 x daily - 7 x weekly - 1 sets - 3 reps - 30 second hold  · Supine Heel Slide with Strap - 3 x daily - 7 x weekly - 1 sets - 10 reps - 10 second hold  · Supine Quad Set - 3 x daily - 7 x weekly - 1 sets - 10 reps - 10 second hold  · Standing Heel Raise - 3 x daily - 7 x weekly - 3 sets - 10 reps  · Seated Long Arc Quad - 3 x daily - 7 x weekly - 3 sets - 10 reps  · Mini Squat with Counter Support - 2 x daily - 5 x weekly - 3 sets - 10 reps  · Supine Active Straight Leg Raise - 3 x daily - 7 x weekly - 3 sets - 10 reps  · Supine Ankle Circles - 3 x daily - 7 x weekly - 3 sets - 10 reps  · Standing Toe Dorsiflexion Stretch - 3 x daily - 7 x weekly - 1 sets - 3 reps - 30 sec hold  · Standing Gastroc Stretch - 3 x daily - 7 x weekly - 1 sets - 3 reps - 30 sec hold    [x] (58342) Reviewed/Progressed HEP activities related to strengthening, flexibility, endurance, ROM of core, proximal hip and LE for functional self-care, mobility, lifting and ambulation/stair navigation   [x] (09973)Reviewed/Progressed HEP activities related to improving balance, coordination, kinesthetic sense, posture, motor skill, proprioception of core, proximal hip and LE for self care, mobility, lifting, and ambulation/stair navigation      Manual Treatments:    [x] (55124) Provided manual therapy to mobilize LE, proximal hip and/or LS spine soft tissue/joints for the purpose of modulating pain, promoting relaxation,  increasing ROM, reducing/eliminating soft tissue swelling/inflammation/restriction, improving soft tissue extensibility and allowing for proper ROM for normal function with self care, mobility, lifting and ambulation.      Modalities:  Vaso 15' knee + ankle    Charges:  Timed Code Treatment Minutes: 61   Total Treatment Minutes: 76   Time in: 9:15  Time out: 10:31    [] EVAL (LOW) 46262 (typically 20 minutes face-to-face)  [] EVAL (MOD) 54118 (typically 30 minutes face-to-face)  [] EVAL (HIGH) 69491 (typically 45 minutes face-to-face)  [] RE-EVAL   [x] XI(95833) x 3    [] IONTO  [] NMR (73879) x     [x] VASO  [x] Manual (48995) x 1     [] Other:  [] TA x      [] Mech Traction (84138)  [] ES(attended) (64347)      [] ES (un) (05640):     GOALS:   Patient stated goal: return to work   [] Progressing: [] Met: [] Not Met: [] Adjusted     Therapist goals for Patient:   Short Term Goals: To be achieved in: 2 weeks 2/18/22  1. Independent in HEP and progression per patient tolerance, in order to prevent re-injury. [] Progressing: [] Met: [] Not Met: [] Adjusted   2. Patient will have a decrease in pain to facilitate improvement in movement, function, and ADLs as indicated by Functional Deficits. [] Progressing: [] Met: [] Not Met: [] Adjusted    3. Patient will demonstrate reciprocal gait pattern without use of AD for improved home and community ambulation. [] Progressing: [] Met: [] Not Met: [] Adjusted      Long Term Goals: To be achieved in: 12 weeks 4/29/22  1. Disability index score of 15% or less for the LEFS to assist with reaching prior level of function. [] Progressing: [] Met: [] Not Met: [] Adjusted  2. Patient will demonstrate increased AROM to 0-140 deg or greater to allow for proper joint functioning as indicated by patients Functional Deficits. [] Progressing: [] Met: [] Not Met: [] Adjusted  3. Patient will demonstrate an increase in LLE strength to 4+/5 or greater to allow for proper functional mobility as indicated by patients Functional Deficits. [] Progressing: [] Met: [] Not Met: [] Adjusted  4. Patient will return to ADLs, IADLs and functional activities without increased symptoms or restriction. [] Progressing: [] Met: [] Not Met: [] Adjusted  5.  Patient will tolerate standing >1 hour and be able to negotiate stairs with 0-1/10 pain to allow for return to work. [] Progressing: [] Met: [] Not Met: [] Adjusted         Overall Progression Towards Functional goals/ Treatment Progress Update:  [] Patient is progressing as expected towards functional goals listed. [] Progression is slowed due to complexities/Impairments listed. [] Progression has been slowed due to co-morbidities. [x] Plan just implemented, too soon to assess goals progression <30days   [] Goals require adjustment due to lack of progress  [] Patient is not progressing as expected and requires additional follow up with physician  [] Other    Prognosis for POC: [x] Good [] Fair  [] Poor      Patient requires continued skilled intervention: [x] Yes  [] No    Treatment/Activity Tolerance:  [x] Patient able to complete treatment  [] Patient limited by fatigue  [] Patient limited by pain     [] Patient limited by other medical complications  [x] Other: During IASTM, pt reported decreased tenderness as compared to last visit. Pt had mild erythema present after 4-5 strokes around the medial ankle and post tib. Pt reported a decrease in pain at the ankle since last visit. Pt exhibited a good quad contraction during LAQ and may be able to progress to the leg extension machine NPV. Pt tolerated TRX squats well, but required cueing to maintain a flat back and to tilt the hips forward. Pt improved upon form but will require further instruction next visit to decrease the amount of posterior tilt at hips. Pt also tolerated an increase in resistance on leg press well. Pt exhibited an increase in knee flexion on the ERMI from 127 to 129 deg. Pt requires further PT follow up to address strength, ROM and functional deficits.          PLAN: See eval  [x] Continue per plan of care [] Alter current plan (see comments above)  [] Plan of care initiated [] Hold pending MD visit [] Discharge  Gait training    Electronically signed by:  Nguyễn Worley, PT DPT, OCS  Physical Therapist  [id number]  [email or web address]    CAMPBELL Sanders  Therapist was present, directed the patient's care, made skilled judgement, and was responsible for assessment and treatment of the patient. Note: If patient does not return for scheduled/ recommended follow up visits, this note will serve as a discharge from care along with most recent update on progress.

## 2022-02-22 ENCOUNTER — HOSPITAL ENCOUNTER (OUTPATIENT)
Dept: PHYSICAL THERAPY | Age: 52
Setting detail: THERAPIES SERIES
Discharge: HOME | End: 2022-02-22
Payer: COMMERCIAL

## 2022-02-22 PROCEDURE — 97016 VASOPNEUMATIC DEVICE THERAPY: CPT | Performed by: PHYSICAL THERAPY ASSISTANT

## 2022-02-22 PROCEDURE — 97110 THERAPEUTIC EXERCISES: CPT | Performed by: PHYSICAL THERAPY ASSISTANT

## 2022-02-22 PROCEDURE — 97530 THERAPEUTIC ACTIVITIES: CPT | Performed by: PHYSICAL THERAPY ASSISTANT

## 2022-02-22 PROCEDURE — 97140 MANUAL THERAPY 1/> REGIONS: CPT | Performed by: PHYSICAL THERAPY ASSISTANT

## 2022-02-22 NOTE — FLOWSHEET NOTE
The [facility] and [address],  Sports Performance and Rehabilitation, [facility] [address]199 [address]  [address] [facility],5[address]   Robbie Wetzel  Phone: [phone]  Fax: [phone]      Physical Therapy Daily Treatment Note  Date:  2022    Patient Name:  Rita Nicolas    :  1970  MRN: [id number]  Restrictions/Precautions:    Medical/Treatment Diagnosis Information:  · Diagnosis: R18.966C (ICD-10-CM) - Acute medial meniscus tear of left knee, initial [id number] (ICD-10-CM) - Acute lateral meniscus tear of left knee, initial encounter  · S/p L knee PMM, PLM DOS 22  · Treatment Diagnosis: M25.652, M25.462, R53.1, O87.4  Insurance/Certification information:  PT Insurance Information: [facility]  Physician Information:  Referring Practitioner: Neo Lawson MD  Has the plan of care been signed (Y/N):        []  Yes  [x]  No     Date of Patient follow up with Physician: 22  Patient seen in consultation with Dr. Carla Ron who established the initial/subsequent treatment protocol. 22: reviewed surgical findings, will continue conservative approach for OA management       Is this a Progress Report:     []  Yes  [x]  No        If Yes:  Date Range for reporting period:  Beginning 22  Ending    Progress report will be due (10 Rx or 30 days whichever is less): 92       Recertification will be due (POC Duration  / 90 days whichever is less): 22         Visit # Insurance Allowable Auth Required   IE+5   20 []  Yes [x]  No        Functional Scale:    Date assessed:    LEFS Score: 44/80=55%     22     Latex Allergy:  [x]NO      []YES  Preferred Language for Healthcare:   [x]English       []other:    Pain level:  2/10     SUBJECTIVE: 3 weeks po. Reports infrapatellar pain with walking as well as ankle discomfort.      OBJECTIVE:      Flexibility L R Comment   Hamstring Mild tightness       Gastroc Mild tightness       ITB Not assessed       Quad Moderate tightness                              ROM PROM AROM Overpressure Comment     L R L R L R     Flexion 129 ERMI    Not assessed           Extension 0   0                                                  MMT not assessed d/t recent surgery  Strength L R Comment   Quad Quad set: fair -       Hamstring         Gastroc         Hip  flexion         Hip abd                                Not assessed d/t recent surgery  Special Test Results/Comment   Meniscal Click     Crepitus     Flexion Test     Valgus Laxity     Varus Laxity     Lachmans     Drop Back     Homans -         Girth L R   Mid Patella 42.6 cm 39.5 cm   Suprapatellar 44 cm 41.5 cm   5cm above 44.5 cm 44.2 cm      Reflexes/Sensation:               []Dermatomes/Myotomes intact               []Reflexes equal and normal bilaterally              []Other:     Joint mobility: Not assessed               []Normal                      []Hypo              []Hyper     Palpation: Generalized TTP around surgical portals     Functional Mobility/Transfers: Decreased use of LLE d/t surgery, antalgic gait     Posture: WNL     Bandages/Dressings/Incisions:   2/4/22 Post op dressings removed in clinic. No s/s of infection. Incisions clean and dry. +bogginess at distal quad. Gait:   2/4/22 Arrives PWB with RW. WBAT, decreased step length, decreased toe off and heel strike, decreased knee flexion. 2/18/22 FWB without AD. Flat foot contact with LLE, decreased stance time, decreased stride length, mild shuffling of feet.       RESTRICTIONS/PRECAUTIONS:     Exercises/Interventions:   Exercise/Equipment Resistance/Repetitions Other comments   Stretching     Hamstring 3x30\"       Inclined Calf 3x30''    Hip Flexion     ITB     Groin     Quad     ERMI  5x30\"-NPV    Standing toe extension HEP    Standing Gastroc HEP    Ankle circles/AROM 1x10 CW, CCW, inv, eversion, PF AROM         SLR     Supine    Abduction 3x10   Adduction     Prone     SLR+          Isometrics     Quad sets Patellar Glides     Medial     Superior     Inferior          ROM     Sheet Pulls    Hang Weights     Passive     Active     Weight Shift     Ankle Pumps     Bike   Seat 9  Res 1.5              CKC     Calf raises     Wall sits     Step ups     1 leg stand        CC TKE 30x3\" 20# hoist    Balance     bridges     rockerboard 2' A/P tipping   PRE     RANGE:   Flexion  RANGE:   4 way ankle  Attempted, d/c'd due to limited mobility          Quantum machines     Leg press  3x10 DL  100#   Leg extension 3x10 DL 90-30°, 40#   Leg curl 3x10 DL 0-90°, 40>30#        Manual interventions     IASTM 8' post tib and ant tib, medial/anterior ankle  HG 9 strumming, brushing, stroking            Plan for next session:     Therapeutic Exercise and NMR EXR  [x] (74437) Provided verbal/tactile cueing for activities related to strengthening, flexibility, endurance, ROM for improvements in LE, proximal hip, and core control with self care, mobility, lifting, ambulation. [x] (39481) Provided verbal/tactile cueing for activities related to improving balance, coordination, kinesthetic sense, posture, motor skill, proprioception  to assist with LE, proximal hip, and core control in self care, mobility, lifting, ambulation and eccentric single leg control. NMR and Therapeutic Activities:    [x] (71681 or 74208) Provided verbal/tactile cueing for activities related to improving balance, coordination, kinesthetic sense, posture, motor skill, proprioception and motor activation to allow for proper function of core, proximal hip and LE with self care and ADLs  [x] (57937) Gait Re-education- Provided training and instruction to the patient for proper LE, core and proximal hip recruitment and positioning and eccentric body weight control with ambulation re-education including up and down stairs     Home Exercise Program:    Access Code: [email or web address]  URL: [email or web address]  Date: 02/16/2022  Prepared by: Andrei Williamson Carito Bucy    Exercises  · Seated Table Hamstring Stretch - 3 x daily - 7 x weekly - 1 sets - 3 reps - 30 second hold  · Long Sitting Calf Stretch with Strap - 3 x daily - 7 x weekly - 1 sets - 3 reps - 30 second hold  · Supine Heel Slide with Strap - 3 x daily - 7 x weekly - 1 sets - 10 reps - 10 second hold  · Supine Quad Set - 3 x daily - 7 x weekly - 1 sets - 10 reps - 10 second hold  · Standing Heel Raise - 3 x daily - 7 x weekly - 3 sets - 10 reps  · Seated Long Arc Quad - 3 x daily - 7 x weekly - 3 sets - 10 reps  · Mini Squat with Counter Support - 2 x daily - 5 x weekly - 3 sets - 10 reps  · Supine Active Straight Leg Raise - 3 x daily - 7 x weekly - 3 sets - 10 reps  · Supine Ankle Circles - 3 x daily - 7 x weekly - 3 sets - 10 reps  · Standing Toe Dorsiflexion Stretch - 3 x daily - 7 x weekly - 1 sets - 3 reps - 30 sec hold  · Standing Gastroc Stretch - 3 x daily - 7 x weekly - 1 sets - 3 reps - 30 sec hold    [x] (07159) Reviewed/Progressed HEP activities related to strengthening, flexibility, endurance, ROM of core, proximal hip and LE for functional self-care, mobility, lifting and ambulation/stair navigation   [x] (49561)Reviewed/Progressed HEP activities related to improving balance, coordination, kinesthetic sense, posture, motor skill, proprioception of core, proximal hip and LE for self care, mobility, lifting, and ambulation/stair navigation      Manual Treatments:    [x] (00530) Provided manual therapy to mobilize LE, proximal hip and/or LS spine soft tissue/joints for the purpose of modulating pain, promoting relaxation,  increasing ROM, reducing/eliminating soft tissue swelling/inflammation/restriction, improving soft tissue extensibility and allowing for proper ROM for normal function with self care, mobility, lifting and ambulation.      Modalities:  Vaso 15' knee, CP ankle    Charges:  Timed Code Treatment Minutes: 55   Total Treatment Minutes: 71   Time in: 3:20  Time out: 4:31    [] EVAL (LOW) work.   [] Progressing: [] Met: [] Not Met: [] Adjusted         Overall Progression Towards Functional goals/ Treatment Progress Update:  [] Patient is progressing as expected towards functional goals listed. [] Progression is slowed due to complexities/Impairments listed. [] Progression has been slowed due to co-morbidities. [x] Plan just implemented, too soon to assess goals progression <30days   [] Goals require adjustment due to lack of progress  [] Patient is not progressing as expected and requires additional follow up with physician  [] Other    Prognosis for POC: [x] Good [] Fair  [] Poor      Patient requires continued skilled intervention: [x] Yes  [] No    Treatment/Activity Tolerance:  [x] Patient able to complete treatment  [] Patient limited by fatigue  [] Patient limited by pain     [] Patient limited by other medical complications  [x] Other: Tolerated PREs well. Lacks heel strike at initial contact of gait. IASTM to post tib and ant tib due to current complaints/location of ankle pain and restriction of motion. Pt requires further PT follow up to address strength, ROM and functional deficits. PLAN: See eval  [x] Continue per plan of care [] Alter current plan (see comments above)  [] Plan of care initiated [] Hold pending MD visit [] Discharge  Gait training    Electronically signed by:  Cipriano Mcpherson PTA         Note: If patient does not return for scheduled/ recommended follow up visits, this note will serve as a discharge from care along with most recent update on progress.

## 2022-02-24 ENCOUNTER — HOSPITAL ENCOUNTER (OUTPATIENT)
Dept: PHYSICAL THERAPY | Age: 52
Setting detail: THERAPIES SERIES
Discharge: HOME | End: 2022-02-24
Payer: COMMERCIAL

## 2022-02-24 PROCEDURE — 97140 MANUAL THERAPY 1/> REGIONS: CPT | Performed by: PHYSICAL THERAPY ASSISTANT

## 2022-02-24 PROCEDURE — 97110 THERAPEUTIC EXERCISES: CPT | Performed by: PHYSICAL THERAPY ASSISTANT

## 2022-02-24 PROCEDURE — 97530 THERAPEUTIC ACTIVITIES: CPT | Performed by: PHYSICAL THERAPY ASSISTANT

## 2022-02-24 PROCEDURE — 97112 NEUROMUSCULAR REEDUCATION: CPT | Performed by: PHYSICAL THERAPY ASSISTANT

## 2022-02-24 PROCEDURE — 97016 VASOPNEUMATIC DEVICE THERAPY: CPT | Performed by: PHYSICAL THERAPY ASSISTANT

## 2022-02-24 NOTE — FLOWSHEET NOTE
[facility] and [address],  Sports Performance and Rehabilitation, [facility] [address]  [address] [facility],5Th Floor   Robbie Wetzel  Phone: [phone]  Fax: [phone]      Physical Therapy Daily Treatment Note  Date:  2022    Patient Name:  Duarte Street    :  1970  MRN: [id number]  Restrictions/Precautions:    Medical/Treatment Diagnosis Information:  · Diagnosis: S83.242A (ICD-10-CM) - Acute medial meniscus tear of left knee, initial rwlxloclcK75.282A (ICD-10-CM) - Acute lateral meniscus tear of left knee, initial encounter  · S/p L knee PMM, PLM DOS 22  · Treatment Diagnosis: M25.652, M25.462, R53.1, M01.5  Insurance/Certification information:  PT Insurance Information: [facility]  Physician Information:  Referring Practitioner: Froylan Lynn MD  Has the plan of care been signed (Y/N):        []  Yes  [x]  No     Date of Patient follow up with Physician: 22  Patient seen in consultation with Dr. Oscar Gandhi who established the initial/subsequent treatment protocol. 22: reviewed surgical findings, will continue conservative approach for OA management       Is this a Progress Report:     []  Yes  [x]  No        If Yes:  Date Range for reporting period:  Beginning 22  Ending    Progress report will be due (10 Rx or 30 days whichever is less): 57       Recertification will be due (POC Duration  / 90 days whichever is less): 22         Visit # Insurance Allowable Auth Required   IE+6   20 []  Yes [x]  No        Functional Scale:    Date assessed:    LEFS Score: 44/80=55%     22     Latex Allergy:  [x]NO      []YES  Preferred Language for Healthcare:   [x]English       []other:    Pain level:  2/10     SUBJECTIVE: 3 weeks po. Knee and ankle are feeling much better. Reports infrapatellar pain with walking is minimal today.     OBJECTIVE:      Flexibility L R Comment   Hamstring Mild tightness       Gastroc Mild tightness       ITB Not assessed       Quad Moderate tightness                              ROM PROM AROM Overpressure Comment     L R L R L R     Flexion 129 ERMI    Not assessed           Extension 0   0                                                  MMT not assessed d/t recent surgery  Strength L R Comment   Quad Quad set: fair -       Hamstring         Gastroc         Hip  flexion         Hip abd                                Not assessed d/t recent surgery  Special Test Results/Comment   Meniscal Click     Crepitus     Flexion Test     Valgus Laxity     Varus Laxity     Lachmans     Drop Back     Homans -         Girth L R   Mid Patella 42.6 cm 39.5 cm   Suprapatellar 44 cm 41.5 cm   5cm above 44.5 cm 44.2 cm      Reflexes/Sensation:               []Dermatomes/Myotomes intact               []Reflexes equal and normal bilaterally              []Other:     Joint mobility: Not assessed               []Normal                      []Hypo              []Hyper     Palpation: Generalized TTP around surgical portals     Functional Mobility/Transfers: Decreased use of LLE d/t surgery, antalgic gait     Posture: WNL     Bandages/Dressings/Incisions:   2/4/22 Post op dressings removed in clinic. No s/s of infection. Incisions clean and dry. +bogginess at distal quad. Gait:   2/4/22 Arrives PWB with RW. WBAT, decreased step length, decreased toe off and heel strike, decreased knee flexion. 2/18/22 FWB without AD. Flat foot contact with LLE, decreased stance time, decreased stride length, mild shuffling of feet.       RESTRICTIONS/PRECAUTIONS:     Exercises/Interventions:   Exercise/Equipment Resistance/Repetitions Other comments   Stretching     Hamstring 3x30\"       Inclined Calf 3x30''    Hip Flexion     ITB     Groin     Quad     ERMI  5x30\"-NPV    Standing toe extension HEP    Standing Gastroc HEP    Ankle circles/AROM 1x10 CW, CCW, inv, eversion, PF AROM         SLR     Supine    Abduction [id number]   Adduction     Prone     SLR+ Isometrics     Quad sets          Patellar Glides     Medial     Superior     Inferior          ROM     Sheet Pulls    Hang Weights     Passive     Active     Weight Shift     Ankle Pumps     Bike   Seat 9  Res 1.5              CKC     Calf raises     Wall sits     Step ups     1 leg stand        CC TKE 30x3\" 20# hoist NPV   Balance     bridges     rockerboard 2' A/P tipping   PRE     RANGE:   Flexion  RANGE:   4 way ankle  Attempted, d/c'd due to limited mobility          Quantum machines     Leg press  3x10 DL  100#   Leg extension 3x10 DL 90-30°, 40#   Leg curl 3x10 DL 0-90°, 40>30#        Manual interventions STM to inferior portals 5'    IASTM 8' post tib and tibialis ant, medial/anterior ankle  HG 9 strumming, brushing, stroking       TM 5'  Gait - vc's heel strike>toe off     Plan for next session: cont strength progression/ balance    Therapeutic Exercise and NMR EXR  [x] (30737) Provided verbal/tactile cueing for activities related to strengthening, flexibility, endurance, ROM for improvements in LE, proximal hip, and core control with self care, mobility, lifting, ambulation. [x] (39394) Provided verbal/tactile cueing for activities related to improving balance, coordination, kinesthetic sense, posture, motor skill, proprioception  to assist with LE, proximal hip, and core control in self care, mobility, lifting, ambulation and eccentric single leg control.      NMR and Therapeutic Activities:    [x] (55725 or 21463) Provided verbal/tactile cueing for activities related to improving balance, coordination, kinesthetic sense, posture, motor skill, proprioception and motor activation to allow for proper function of core, proximal hip and LE with self care and ADLs  [x] (89745) Gait Re-education- Provided training and instruction to the patient for proper LE, core and proximal hip recruitment and positioning and eccentric body weight control with ambulation re-education including up and down stairs Home Exercise Program:    Access Code: [email or web address]  URL: [email or web address]  Date: 02/16/2022  Prepared by: Baby Base    Exercises  · Seated Table Hamstring Stretch - 3 x daily - 7 x weekly - 1 sets - 3 reps - 30 second hold  · Long Sitting Calf Stretch with Strap - 3 x daily - 7 x weekly - 1 sets - 3 reps - 30 second hold  · Supine Heel Slide with Strap - 3 x daily - 7 x weekly - 1 sets - 10 reps - 10 second hold  · Supine Quad Set - 3 x daily - 7 x weekly - 1 sets - 10 reps - 10 second hold  · Standing Heel Raise - 3 x daily - 7 x weekly - 3 sets - 10 reps  · Seated Long Arc Quad - 3 x daily - 7 x weekly - 3 sets - 10 reps  · Mini Squat with Counter Support - 2 x daily - 5 x weekly - 3 sets - 10 reps  · Supine Active Straight Leg Raise - 3 x daily - 7 x weekly - 3 sets - 10 reps  · Supine Ankle Circles - 3 x daily - 7 x weekly - 3 sets - 10 reps  · Standing Toe Dorsiflexion Stretch - 3 x daily - 7 x weekly - 1 sets - 3 reps - 30 sec hold  · Standing Gastroc Stretch - 3 x daily - 7 x weekly - 1 sets - 3 reps - 30 sec hold    [x] (65008) Reviewed/Progressed HEP activities related to strengthening, flexibility, endurance, ROM of core, proximal hip and LE for functional self-care, mobility, lifting and ambulation/stair navigation   [x] (49228)Reviewed/Progressed HEP activities related to improving balance, coordination, kinesthetic sense, posture, motor skill, proprioception of core, proximal hip and LE for self care, mobility, lifting, and ambulation/stair navigation      Manual Treatments:    [x] (82133) Provided manual therapy to mobilize LE, proximal hip and/or LS spine soft tissue/joints for the purpose of modulating pain, promoting relaxation,  increasing ROM, reducing/eliminating soft tissue swelling/inflammation/restriction, improving soft tissue extensibility and allowing for proper ROM for normal function with self care, mobility, lifting and ambulation.      Modalities:  Vaso 15' knee, CP ankle    Charges:  Timed Code Treatment Minutes: 55   Total Treatment Minutes: 71   Time in: 4:10  Time out: 5:21    [] EVAL (LOW) 70629 (typically 20 minutes face-to-face)  [] EVAL (MOD) 12637 (typically 30 minutes face-to-face)  [] EVAL (HIGH) 82682 (typically 45 minutes face-to-face)  [] RE-EVAL   [x] MM(33239) x 1    [] IONTO  [x] NMR (53097) x  1   [x] VASO  [x] Manual (75603) x 1     [] Other:  [x] TA x  1    [] Mech Traction (67274)  [] ES(attended) (98964)      [] ES (un) (55077):     GOALS:   Patient stated goal: return to work   [] Progressing: [] Met: [] Not Met: [] Adjusted     Therapist goals for Patient:   Short Term Goals: To be achieved in: 2 weeks 2/18/22  1. Independent in HEP and progression per patient tolerance, in order to prevent re-injury. [] Progressing: [] Met: [] Not Met: [] Adjusted   2. Patient will have a decrease in pain to facilitate improvement in movement, function, and ADLs as indicated by Functional Deficits. [] Progressing: [] Met: [] Not Met: [] Adjusted    3. Patient will demonstrate reciprocal gait pattern without use of AD for improved home and community ambulation. [] Progressing: [] Met: [] Not Met: [] Adjusted      Long Term Goals: To be achieved in: 12 weeks 4/29/22  1. Disability index score of 15% or less for the LEFS to assist with reaching prior level of function. [] Progressing: [] Met: [] Not Met: [] Adjusted  2. Patient will demonstrate increased AROM to 0-140 deg or greater to allow for proper joint functioning as indicated by patients Functional Deficits. [] Progressing: [] Met: [] Not Met: [] Adjusted  3. Patient will demonstrate an increase in LLE strength to 4+/5 or greater to allow for proper functional mobility as indicated by patients Functional Deficits. [] Progressing: [] Met: [] Not Met: [] Adjusted  4. Patient will return to ADLs, IADLs and functional activities without increased symptoms or restriction.    [] Progressing: [] Met: [] Not

## 2022-03-01 ENCOUNTER — HOSPITAL ENCOUNTER (OUTPATIENT)
Dept: PHYSICAL THERAPY | Age: 52
Setting detail: THERAPIES SERIES
Discharge: HOME | End: 2022-03-01
Payer: COMMERCIAL

## 2022-03-01 ENCOUNTER — OFFICE VISIT (OUTPATIENT)
Dept: ORTHOPEDIC SURGERY | Age: 52
End: 2022-03-01

## 2022-03-01 VITALS — WEIGHT: 215 LBS | HEIGHT: 68 IN | BODY MASS INDEX: 32.58 KG/M2

## 2022-03-01 DIAGNOSIS — S83.242A ACUTE MEDIAL MENISCUS TEAR OF LEFT KNEE, INITIAL ENCOUNTER: Primary | ICD-10-CM

## 2022-03-01 DIAGNOSIS — S83.282A ACUTE LATERAL MENISCUS TEAR OF LEFT KNEE, INITIAL ENCOUNTER: ICD-10-CM

## 2022-03-01 PROCEDURE — 97016 VASOPNEUMATIC DEVICE THERAPY: CPT | Performed by: PHYSICAL THERAPY ASSISTANT

## 2022-03-01 PROCEDURE — 97110 THERAPEUTIC EXERCISES: CPT | Performed by: PHYSICAL THERAPY ASSISTANT

## 2022-03-01 PROCEDURE — 97112 NEUROMUSCULAR REEDUCATION: CPT | Performed by: PHYSICAL THERAPY ASSISTANT

## 2022-03-01 PROCEDURE — 97530 THERAPEUTIC ACTIVITIES: CPT | Performed by: PHYSICAL THERAPY ASSISTANT

## 2022-03-01 PROCEDURE — 99024 POSTOP FOLLOW-UP VISIT: CPT | Performed by: ORTHOPAEDIC SURGERY

## 2022-03-01 NOTE — PROGRESS NOTES
Chief Complaint    Post-Op Check (L knee)      History of Present Illness:  Raven Pettit is a 46 y.o. male who presents for follow up of left knee(s). The patient is here for 4 weeks follow up from their left Arthroscopic partial medial and lateral menisectomy on 2/2/2022. The patient is doing very well over all. They are managing their pain with medication, ice and rest. They report their range of motion to be 0 to 129 with some difficulty. They are working closely with physical therapy on the post operative rehabilitation protocol as well as a home exercise program. They report no numbness, paresthesia or weakness at this time and they have no calf tenderness or new injuries to report. Pt reports doing well overall, states he does have some mild anterior knee pain with walking extended periods. Pain Assessment:  Location: left  Level: 2 out of 10  Duration: Hours  Description: achy  Result of an injury: Yes  Work related injury: No  Aggravating actions: walking  Relieving Factors: rest  Wants injections: No     Past Medical History:   Diagnosis Date    Appendicitis         Past Surgical History:   Procedure Laterality Date    APPENDECTOMY  2010    KNEE ARTHROSCOPY Left 2/2/2022    LEFT KNEE ARTHROSCOPY WITH MEDIAL AND LATERAL PARTIAL MENISCECTOMY performed by Nikita Morrison MD at [facility] History   Problem Relation Age of Onset    Diabetes Father     Diabetes Paternal Grandmother     Diabetes Paternal Uncle     Prostate Cancer Paternal Uncle     Prostate Cancer Paternal Uncle        Social History     Socioeconomic History    Marital status:      Spouse name: None    Number of children: 1    Years of education: None    Highest education level: None   Occupational History    None   Tobacco Use    Smoking status: Never Smoker    Smokeless tobacco: Never Used   Substance and Sexual Activity    Alcohol use:  Yes     Alcohol/week: 5.0 standard drinks     Types: 6 drink(s) per week    Drug use: No    Sexual activity: Never   Other Topics Concern    None   Social History Narrative    None     Social Determinants of Health     Financial Resource Strain:     Difficulty of Paying Living Expenses: Not on file   Food Insecurity:     Worried About Running Out of Food in the Last Year: Not on file    Patti of Food in the Last Year: Not on file   Transportation Needs:     Lack of Transportation (Medical): Not on file    Lack of Transportation (Non-Medical): Not on file   Physical Activity:     Days of Exercise per Week: Not on file    Minutes of Exercise per Session: Not on file   Stress:     Feeling of Stress : Not on file   Social Connections:     Frequency of Communication with Friends and Family: Not on file    Frequency of Social Gatherings with Friends and Family: Not on file    Attends Judaism Services: Not on file    Active Member of [address] [place] [address] [employer] or Organizations: Not on file    Attends Club or Organization Meetings: Not on file    Marital Status: Not on file   Intimate Partner Violence:     Fear of Current or Ex-Partner: Not on file    Emotionally Abused: Not on file    Physically Abused: Not on file    Sexually Abused: Not on file   Housing Stability:     Unable to Pay for Housing in the Last Year: Not on file    Number of [facility] in the Last Year: Not on file    Unstable Housing in the Last Year: Not on file       Current Outpatient Medications   Medication Sig Dispense Refill    ondansetron (ZOFRAN) 4 MG tablet Take 1 tablet by mouth every 8 hours as needed for Nausea 20 tablet 0    aspirin EC 81 MG EC tablet Take 1 tablet by mouth daily 30 tablet 0    naproxen (NAPROSYN) 500 MG tablet Take 500 mg by mouth 2 times daily as needed      ibuprofen (ADVIL;MOTRIN) 800 MG tablet Take 800 mg by mouth every 8 hours as needed      HYDROcodone-acetaminophen (NORCO) 5-325 MG per tablet Take 1 tablet by mouth every 6 hours as needed.       sildenafil (VIAGRA) 50 MG tablet Take 1 tablet by mouth as needed for Erectile Dysfunction. 15 tablet 3    triamcinolone (KENALOG) 0.1 % lotion Apply topically to affected area  2- 3 times daily. 30 mL 1    clotrimazole (LOTRIMIN) 1 % cream Apply topically 2 times daily. 30 g 1    omeprazole (PRILOSEC) 20 MG capsule Take 1 capsule by mouth daily. 30 capsule 3     No current facility-administered medications for this visit. No Known Allergies    Review of Systems:  A 14 point review of systems was completed by the patient and is available in the media section of the scanned medical record and was reviewed on 3/1/2022. The review is negative with the exception of those things mentioned in the HPI and Past Medical History     Vital Signs:   Ht 5' 8\" (1.727 m)   Wt 215 lb (97.5 kg)   BMI 32.69 kg/m²     General Exam:   Mental Status: The patient is oriented to time, place and person. The patient's mood and affect are appropriate. Neurological: The patient has good coordination. There is no weakness or sensory deficit. Knee Examination: Left    Skin:  There are no skin lesions, cellulitis, or extreme edema in the lower extremities. Sensation is grossly intact to light touch bilaterally lower extremity. The patient has warm and well-perfused Bilateral lower extremities with brisk capillary refill. Inspection:  The is mild edema, no gross deformities, and no signs of infection. Surgical incision is healing well with no signs of infection. Palpation: There is no patellofemoral crepitus, there is no significant tenderness over the knee consistent with their post operative status. Range of Motion:  0 to 129  and grossly intact without pain and/or difficulty    Strength: Motor is grossly intact    Special Tests: There is no ligament instability.  Grinding/Crepitus (-)    Gait: non antalgic  without the use of assistive devices    Alignment: neutral    Radiology:     None      Office Procedures:  No orders of the defined types were placed in this encounter. Assessment: Rita Nicolas is a 46 y.o. male who presents for follow up of left knee(s). 4 weeks Post Op L Partial Medial/Lateral Menisectomy    Patient's workup and evaluation were reviewed with the patient in the office today. Imaging was also reviewed with the patient in the clinic today. Given the patient's history and physical exam the patient is healing appropriately on the post operative course. They should continue to work closely with physical therapy to progress their range of motion and strength gains. Pt is doing well overall. He should continue to progress as tolerated in physical therapy. He states understanding and agreement. Treatment Plan:    1. Activity modification and rest  2. Ice 20 minutes every 1-2 hours PRN  3. NSAIDs PRN  4. Elevation at least 2 inches above the heart with pillows supporting the joint underneath for swelling  5. Home exercises to maintain strength and range of motion  6. Physical therapy including Strengthening  7. Appropriate diet/weight management      Diagnoses and treatments were reviewed with the patient today. Patient education material was provided. Questions were entertained and answered to the patient's satisfaction. Follow up: Physical Therapy      Brenton REYES ATC am scribing for and in the presence of Dr. Neo aLwson. The physical examination was performed by Dr. Neo Lawson. All counseling during the appointment was performed between the patient and Dr. Neo Lawson. 03/01/22 4:53 PM   Leidy Denis MS, ATC. This dictation was performed with a verbal recognition program (DRAGON) and it was checked for errors. It is possible that there are still dictated errors within this office note. If so, please bring any errors to my attention for an addendum. All efforts were made to ensure that this office note is accurate.     Supervising Physician Attestation:  IDr. Neo, personally performed the services described in this documentation as scribed above, and it is both accurate and complete and I agree with all pertinent clinical information. I personally interviewed the patient and performed a physical examination and medical decision making. I discussed the patient's condition and treatment options and have  reviewed and agree with the past medical, family and social history unless otherwise noted. All of the patient's questions were answered.       Board Certified Orthopaedic Surgeon  [facility] and [address] and [address] and [employer]  Professor of [address]

## 2022-03-01 NOTE — FLOWSHEET NOTE
[facility] and [address],  Sports Performance and Rehabilitation, [facility] [address]  793 [facility],5Th Floor   Robbie Wetzel  Phone: [phone]  Fax: [phone]      Physical Therapy Daily Treatment Note  Date:  3/1/2022    Patient Name:  Chato Lloyd    :  1970  MRN: [id number]  Restrictions/Precautions:    Medical/Treatment Diagnosis Information:  · Diagnosis: P48.818L (ICD-10-CM) - Acute medial meniscus tear of left knee, initial [id number] (ICD-10-CM) - Acute lateral meniscus tear of left knee, initial encounter  · S/p L knee PMM, PLM DOS 22  · Treatment Diagnosis: M25.652, M25.462, R53.1, J88.1  Insurance/Certification information:  PT Insurance Information: [facility]  Physician Information:  Referring Practitioner: Diana Pickett MD  Has the plan of care been signed (Y/N):        []  Yes  [x]  No     Date of Patient follow up with Physician: 22  Patient seen in consultation with Dr. Rodo Oliva who established the initial/subsequent treatment protocol. 22: reviewed surgical findings, will continue conservative approach for OA management  3-1-22: seen for 3 week check in MD office       Is this a Progress Report:     []  Yes  [x]  No        If Yes:  Date Range for reporting period:  Beginning 22  Ending    Progress report will be due (10 Rx or 30 days whichever is less):        Recertification will be due (POC Duration  / 90 days whichever is less): 22         Visit # Insurance Allowable Auth Required   IE+7   20 []  Yes [x]  No        Functional Scale:    Date assessed:    LEFS Score: 44/80=55%     22     Latex Allergy:  [x]NO      []YES  Preferred Language for Healthcare:   [x]English       []other:    Pain level:  2/10     SUBJECTIVE: Doing well. Reports infrapatellar pain at lateral portal with walking.     OBJECTIVE:      Flexibility L R Comment   Hamstring Mild tightness       Gastroc Mild tightness       ITB Not assessed       Quad Moderate tightness                              ROM PROM AROM Overpressure Comment     L R L R L R     Flexion 129 ERMI    Not assessed           Extension 0   0                                                  MMT not assessed d/t recent surgery  Strength L R Comment   Quad Quad set: fair -       Hamstring         Gastroc         Hip  flexion         Hip abd                                Not assessed d/t recent surgery  Special Test Results/Comment   Meniscal Click     Crepitus     Flexion Test     Valgus Laxity     Varus Laxity     Lachmans     Drop Back     Homans -         Girth L R   Mid Patella 42.6 cm 39.5 cm   Suprapatellar 44 cm 41.5 cm   5cm above 44.5 cm 44.2 cm      Reflexes/Sensation:               []Dermatomes/Myotomes intact               []Reflexes equal and normal bilaterally              []Other:     Joint mobility: Not assessed               []Normal                      []Hypo              []Hyper     Palpation: Generalized TTP around surgical portals     Functional Mobility/Transfers: Decreased use of LLE d/t surgery, antalgic gait     Posture: WNL     Bandages/Dressings/Incisions:   2/4/22 Post op dressings removed in clinic. No s/s of infection. Incisions clean and dry. +bogginess at distal quad. Gait:   2/4/22 Arrives PWB with RW. WBAT, decreased step length, decreased toe off and heel strike, decreased knee flexion. 2/18/22 FWB without AD. Flat foot contact with LLE, decreased stance time, decreased stride length, mild shuffling of feet.       RESTRICTIONS/PRECAUTIONS:     Exercises/Interventions:   Exercise/Equipment Resistance/Repetitions Other comments   Stretching     Hamstring 3x30\"       Inclined Calf 3x30''    Hip Flexion     ITB     Groin     Quad     ERMI  5x30\"-NPV    Standing toe extension HEP    Standing Gastroc HEP    Ankle circles/AROM 1x10 CW, CCW, inv, eversion, PF AROM         SLR     Supine    Abduction [id number]   Adduction     Prone     SLR+ Isometrics     Quad sets          Patellar Glides     Medial     Superior     Inferior          ROM     Sheet Pulls    Hang Weights     Passive     Active     Weight Shift     Ankle Pumps     Bike  5' Seat 9  Res 1.5              CKC     Calf raises     Wall sits     Step ups     1 leg stand        CC TKE 30x3\" 20# hoist NPV   Balance     bridges     rockerboard 2' A/P tipping   PRE     RANGE:   Flexion  RANGE:   4 way ankle  Attempted, d/c'd due to limited mobility          Quantum machines     Leg press  3x10 DL  100#   Leg extension 3x10 DL 90-30°, 40#   Leg curl 3x10 DL 0-90°, 40>30#        Manual interventions STM to inferior portals 5'    IASTM 8' post tib and tibialis ant, medial/anterior ankle  HG 9 strumming, brushing, stroking       TM 5'  Gait - vc's heel strike>toe off     Plan for next session: cont strength progression/ balance    Therapeutic Exercise and NMR EXR  [x] (55981) Provided verbal/tactile cueing for activities related to strengthening, flexibility, endurance, ROM for improvements in LE, proximal hip, and core control with self care, mobility, lifting, ambulation. [x] (60023) Provided verbal/tactile cueing for activities related to improving balance, coordination, kinesthetic sense, posture, motor skill, proprioception  to assist with LE, proximal hip, and core control in self care, mobility, lifting, ambulation and eccentric single leg control.      NMR and Therapeutic Activities:    [x] (09401 or 45064) Provided verbal/tactile cueing for activities related to improving balance, coordination, kinesthetic sense, posture, motor skill, proprioception and motor activation to allow for proper function of core, proximal hip and LE with self care and ADLs  [x] (64552) Gait Re-education- Provided training and instruction to the patient for proper LE, core and proximal hip recruitment and positioning and eccentric body weight control with ambulation re-education including up and down stairs Home Exercise Program:    Access Code: [email or web address]  URL: [email or web address]  Date: 02/16/2022  Prepared by: Lee Xie    Exercises  · Seated Table Hamstring Stretch - 3 x daily - 7 x weekly - 1 sets - 3 reps - 30 second hold  · Long Sitting Calf Stretch with Strap - 3 x daily - 7 x weekly - 1 sets - 3 reps - 30 second hold  · Supine Heel Slide with Strap - 3 x daily - 7 x weekly - 1 sets - 10 reps - 10 second hold  · Supine Quad Set - 3 x daily - 7 x weekly - 1 sets - 10 reps - 10 second hold  · Standing Heel Raise - 3 x daily - 7 x weekly - 3 sets - 10 reps  · Seated Long Arc Quad - 3 x daily - 7 x weekly - 3 sets - 10 reps  · Mini Squat with Counter Support - 2 x daily - 5 x weekly - 3 sets - 10 reps  · Supine Active Straight Leg Raise - 3 x daily - 7 x weekly - 3 sets - 10 reps  · Supine Ankle Circles - 3 x daily - 7 x weekly - 3 sets - 10 reps  · Standing Toe Dorsiflexion Stretch - 3 x daily - 7 x weekly - 1 sets - 3 reps - 30 sec hold  · Standing Gastroc Stretch - 3 x daily - 7 x weekly - 1 sets - 3 reps - 30 sec hold    [x] (42715) Reviewed/Progressed HEP activities related to strengthening, flexibility, endurance, ROM of core, proximal hip and LE for functional self-care, mobility, lifting and ambulation/stair navigation   [x] (71566)Reviewed/Progressed HEP activities related to improving balance, coordination, kinesthetic sense, posture, motor skill, proprioception of core, proximal hip and LE for self care, mobility, lifting, and ambulation/stair navigation      Manual Treatments:    [x] (67870) Provided manual therapy to mobilize LE, proximal hip and/or LS spine soft tissue/joints for the purpose of modulating pain, promoting relaxation,  increasing ROM, reducing/eliminating soft tissue swelling/inflammation/restriction, improving soft tissue extensibility and allowing for proper ROM for normal function with self care, mobility, lifting and ambulation.      Modalities:  Vaso 13' knee    Charges:  Timed Code Treatment Minutes: 50   Total Treatment Minutes: 65       [] EVAL (LOW) 82907 (typically 20 minutes face-to-face)  [] EVAL (MOD) 60325 (typically 30 minutes face-to-face)  [] EVAL (HIGH) 20997 (typically 45 minutes face-to-face)  [] RE-EVAL   [x] IN(60351) x 1    [] IONTO  [x] NMR (54174) x  1   [x] VASO  [] Manual (42540) x     [] Other:  [x] TA x  1    [] Mech Traction (39484)  [] ES(attended) (22892)      [] ES (un) (37098):     GOALS:   Patient stated goal: return to work   [] Progressing: [] Met: [] Not Met: [] Adjusted     Therapist goals for Patient:   Short Term Goals: To be achieved in: 2 weeks 2/18/22  1. Independent in HEP and progression per patient tolerance, in order to prevent re-injury. [] Progressing: [] Met: [] Not Met: [] Adjusted   2. Patient will have a decrease in pain to facilitate improvement in movement, function, and ADLs as indicated by Functional Deficits. [] Progressing: [] Met: [] Not Met: [] Adjusted    3. Patient will demonstrate reciprocal gait pattern without use of AD for improved home and community ambulation. [] Progressing: [] Met: [] Not Met: [] Adjusted      Long Term Goals: To be achieved in: 12 weeks 4/29/22  1. Disability index score of 15% or less for the LEFS to assist with reaching prior level of function. [] Progressing: [] Met: [] Not Met: [] Adjusted  2. Patient will demonstrate increased AROM to 0-140 deg or greater to allow for proper joint functioning as indicated by patients Functional Deficits. [] Progressing: [] Met: [] Not Met: [] Adjusted  3. Patient will demonstrate an increase in LLE strength to 4+/5 or greater to allow for proper functional mobility as indicated by patients Functional Deficits. [] Progressing: [] Met: [] Not Met: [] Adjusted  4. Patient will return to ADLs, IADLs and functional activities without increased symptoms or restriction. [] Progressing: [] Met: [] Not Met: [] Adjusted  5.  Patient will tolerate standing >1 hour and be able to negotiate stairs with 0-1/10 pain to allow for return to work. [] Progressing: [] Met: [] Not Met: [] Adjusted         Overall Progression Towards Functional goals/ Treatment Progress Update:  [] Patient is progressing as expected towards functional goals listed. [] Progression is slowed due to complexities/Impairments listed. [] Progression has been slowed due to co-morbidities. [x] Plan just implemented, too soon to assess goals progression <30days   [] Goals require adjustment due to lack of progress  [] Patient is not progressing as expected and requires additional follow up with physician  [] Other    Prognosis for POC: [x] Good [] Fair  [] Poor      Patient requires continued skilled intervention: [x] Yes  [] No    Treatment/Activity Tolerance:  [x] Patient able to complete treatment  [] Patient limited by fatigue  [] Patient limited by pain     [] Patient limited by other medical complications  [x] Other: Showing consistent improvement. Tolerated PREs well. Avoids heel strike/knee extension at initial contact of gait due to discomfort in that position. Required VCs during gait training to improve step length symmetry and heel strike> toe off. Small nodules present at inferior portal sites and tenderness at lateral portal. Pt requires further PT follow up to address strength, ROM and functional deficits. PLAN: See eval  [x] Continue per plan of care [] Alter current plan (see comments above)  [] Plan of care initiated [] Hold pending MD visit [] Discharge      Electronically signed by:  Kiki Castro PTA         Note: If patient does not return for scheduled/ recommended follow up visits, this note will serve as a discharge from care along with most recent update on progress.

## 2022-03-03 ENCOUNTER — HOSPITAL ENCOUNTER (OUTPATIENT)
Dept: PHYSICAL THERAPY | Age: 52
Setting detail: THERAPIES SERIES
Discharge: HOME | End: 2022-03-03
Payer: COMMERCIAL

## 2022-03-03 PROCEDURE — 97112 NEUROMUSCULAR REEDUCATION: CPT | Performed by: PHYSICAL THERAPY ASSISTANT

## 2022-03-03 PROCEDURE — 97110 THERAPEUTIC EXERCISES: CPT | Performed by: PHYSICAL THERAPY ASSISTANT

## 2022-03-03 PROCEDURE — 97530 THERAPEUTIC ACTIVITIES: CPT | Performed by: PHYSICAL THERAPY ASSISTANT

## 2022-03-03 NOTE — FLOWSHEET NOTE
[facility] and [address],  Sports Performance and Rehabilitation, [facility] [address]199 [address]  793 [facility],5[address] Floor   Robbie Wetzel  Phone: [phone]  Fax: [phone]      Physical Therapy Daily Treatment Note  Date:  3/3/2022    Patient Name:  Anthony Marquez    :  1970  MRN: [id number]  Restrictions/Precautions:    Medical/Treatment Diagnosis Information:  Diagnosis: T65.464P (ICD-10-CM) - Acute medial meniscus tear of left knee, initial [id number] (ICD-10-CM) - Acute lateral meniscus tear of left knee, initial encounter  S/p L knee PMM, PLM DOS 22  Treatment Diagnosis: M25.652, M25.462, R53.1, I10.3  Insurance/Certification information:  PT Insurance Information: [facility]  Physician Information:  Referring Practitioner: Malick Schmidt MD  Has the plan of care been signed (Y/N):        []  Yes  [x]  No     Date of Patient follow up with Physician: 22  Patient seen in consultation with Dr. Maribel Lamb who established the initial/subsequent treatment protocol. 22: reviewed surgical findings, will continue conservative approach for OA management  3-1-22: seen for 3 week check in MD office       Is this a Progress Report:     []  Yes  [x]  No        If Yes:  Date Range for reporting period:  Beginning 22  Ending    Progress report will be due (10 Rx or 30 days whichever is less): 61       Recertification will be due (POC Duration  / 90 days whichever is less): 22         Visit # Insurance Allowable Auth Required   IE+7   20 []  Yes [x]  No        Functional Scale:    Date assessed:    LEFS Score: 44/80=55%     22     Latex Allergy:  [x]NO      []YES  Preferred Language for Healthcare:   [x]English       []other:    Pain level:  2/10     SUBJECTIVE: Doing well. Reports infrapatellar pain at lateral portal with walking >20 minutes.     OBJECTIVE:      Flexibility L R Comment   Hamstring Mild tightness       Gastroc Mild tightness       ITB Not assessed       Quad Moderate tightness                              ROM PROM AROM Overpressure Comment     L R L R L R     Flexion 129 ERMI    Not assessed           Extension 0   0                                                  MMT not assessed d/t recent surgery  Strength L R Comment   Quad Quad set: fair -       Hamstring         Gastroc         Hip  flexion         Hip abd                                Not assessed d/t recent surgery  Special Test Results/Comment   Meniscal Click     Crepitus     Flexion Test     Valgus Laxity     Varus Laxity     Lachmans     Drop Back     Homans -         Girth L R   Mid Patella 42.6 cm 39.5 cm   Suprapatellar 44 cm 41.5 cm   5cm above 44.5 cm 44.2 cm      Reflexes/Sensation:               []Dermatomes/Myotomes intact               []Reflexes equal and normal bilaterally              []Other:     Joint mobility: Not assessed               []Normal                      []Hypo              []Hyper     Palpation: Generalized TTP around surgical portals     Functional Mobility/Transfers: Decreased use of LLE d/t surgery, antalgic gait     Posture: WNL     Bandages/Dressings/Incisions:   2/4/22 Post op dressings removed in clinic. No s/s of infection. Incisions clean and dry. +bogginess at distal quad. Gait:   2/4/22 Arrives PWB with RW. WBAT, decreased step length, decreased toe off and heel strike, decreased knee flexion. 2/18/22 FWB without AD. Flat foot contact with LLE, decreased stance time, decreased stride length, mild shuffling of feet.       RESTRICTIONS/PRECAUTIONS:     Exercises/Interventions:   Exercise/Equipment Resistance/Repetitions Other comments 3/3/2022   Stretching      Hamstring 3x30\"  x       Inclined Calf 3x30''  x   Hip Flexion      ITB      Groin      Quad      ERMI  5x30''  NPV   Standing toe extension HEP     Standing Gastroc HEP     Ankle circles/AROM 1x10 CW, CCW, inv, eversion, PF AROM           SLR      Supine     Abduction 3x10 Adduction      Prone      SLR+            Isometrics      Quad sets           Patellar Glides      Medial      Superior      Inferior            ROM      Sheet Pulls     Hang Weights      Passive      Active      Weight Shift      Ankle Pumps      Bike  5' REC Seat 9  Res 1.5  x               CKC      Calf raises     Wall sits      Step ups      1 leg stand          CC TKE 30x3\" 20# hoist  NPV   Balance      bridges      rockerboard 1' ea A/P tipping and balance x   PRE      RANGE:    Flexion  RANGE:    4 way ankle  Attempted, d/c'd due to limited mobility            Quantum machines      Leg press  2x10 DL   2x10 #  80#   X  x   Leg extension 3x10 DL 90-30°, 40# x   Leg curl 3x10 DL 0-90°, 40# x         Manual interventions STM to inferior portals 5'  x   IASTM 8' post tib and tibialis ant, medial/anterior ankle  HG 9 strumming, brushing, stroking        TM 5'  Gait - vc's heel strike>toe off      Plan for next session: cont strength progression/ balance    Therapeutic Exercise and NMR EXR  [x] (48407) Provided verbal/tactile cueing for activities related to strengthening, flexibility, endurance, ROM for improvements in LE, proximal hip, and core control with self care, mobility, lifting, ambulation. [x] (22583) Provided verbal/tactile cueing for activities related to improving balance, coordination, kinesthetic sense, posture, motor skill, proprioception  to assist with LE, proximal hip, and core control in self care, mobility, lifting, ambulation and eccentric single leg control.      NMR and Therapeutic Activities:    [x] (09632 or 57888) Provided verbal/tactile cueing for activities related to improving balance, coordination, kinesthetic sense, posture, motor skill, proprioception and motor activation to allow for proper function of core, proximal hip and LE with self care and ADLs  [x] (73668) Gait Re-education- Provided training and instruction to the patient for proper LE, core and proximal normal function with self care, mobility, lifting and ambulation. Modalities:  Ice cup 8'    Charges:  Timed Code Treatment Minutes: 50   Total Treatment Minutes: 60   4:20-5:20    [] EVAL (LOW) 73656 (typically 20 minutes face-to-face)  [] EVAL (MOD) 87851 (typically 30 minutes face-to-face)  [] EVAL (HIGH) 20177 (typically 45 minutes face-to-face)  [] RE-EVAL   [x] MN(57371) x 1    [] IONTO  [x] NMR (99046) x  1   [] VASO  [] Manual (26564) x     [] Other:  [x] TA x  1    [] Mech Traction (84122)  [] ES(attended) (18671)      [] ES (un) (60902):     GOALS:   Patient stated goal: return to work   [] Progressing: [] Met: [] Not Met: [] Adjusted     Therapist goals for Patient:   Short Term Goals: To be achieved in: 2 weeks 2/18/22  1. Independent in HEP and progression per patient tolerance, in order to prevent re-injury. [] Progressing: [] Met: [] Not Met: [] Adjusted   2. Patient will have a decrease in pain to facilitate improvement in movement, function, and ADLs as indicated by Functional Deficits. [] Progressing: [] Met: [] Not Met: [] Adjusted    3. Patient will demonstrate reciprocal gait pattern without use of AD for improved home and community ambulation. [] Progressing: [] Met: [] Not Met: [] Adjusted      Long Term Goals: To be achieved in: 12 weeks 4/29/22  1. Disability index score of 15% or less for the LEFS to assist with reaching prior level of function. [] Progressing: [] Met: [] Not Met: [] Adjusted  2. Patient will demonstrate increased AROM to 0-140 deg or greater to allow for proper joint functioning as indicated by patients Functional Deficits. [] Progressing: [] Met: [] Not Met: [] Adjusted  3. Patient will demonstrate an increase in LLE strength to 4+/5 or greater to allow for proper functional mobility as indicated by patients Functional Deficits. [] Progressing: [] Met: [] Not Met: [] Adjusted  4.  Patient will return to ADLs, IADLs and functional activities without increased symptoms or restriction. [] Progressing: [] Met: [] Not Met: [] Adjusted  5. Patient will tolerate standing >1 hour and be able to negotiate stairs with 0-1/10 pain to allow for return to work. [] Progressing: [] Met: [] Not Met: [] Adjusted         Overall Progression Towards Functional goals/ Treatment Progress Update:  [] Patient is progressing as expected towards functional goals listed. [] Progression is slowed due to complexities/Impairments listed. [] Progression has been slowed due to co-morbidities. [x] Plan just implemented, too soon to assess goals progression <30days   [] Goals require adjustment due to lack of progress  [] Patient is not progressing as expected and requires additional follow up with physician  [] Other    Prognosis for POC: [x] Good [] Fair  [] Poor      Patient requires continued skilled intervention: [x] Yes  [] No    Treatment/Activity Tolerance:  [x] Patient able to complete treatment  [] Patient limited by fatigue  [] Patient limited by pain     [] Patient limited by other medical complications  [x] Other: Showing consistent improvement. Tolerated all well. Required VCs during gait training to improve step length symmetry and heel strike> toe off. Small nodules present at inferior portal sites and tenderness at lateral portal. Pt requires further PT follow up to address strength, ROM and functional deficits. PLAN: See eval  [x] Continue per plan of care [] Alter current plan (see comments above)  [] Plan of care initiated [] Hold pending MD visit [] Discharge  Hip strength/balance  Updated LEFS  Updated POC    Electronically signed by:  Keturah Burns PTA         Note: If patient does not return for scheduled/ recommended follow up visits, this note will serve as a discharge from care along with most recent update on progress.

## 2022-03-08 ENCOUNTER — HOSPITAL ENCOUNTER (OUTPATIENT)
Dept: PHYSICAL THERAPY | Age: 52
Setting detail: THERAPIES SERIES
Discharge: HOME | End: 2022-03-08
Payer: COMMERCIAL

## 2022-03-08 PROCEDURE — 97112 NEUROMUSCULAR REEDUCATION: CPT | Performed by: PHYSICAL THERAPIST

## 2022-03-08 PROCEDURE — 97530 THERAPEUTIC ACTIVITIES: CPT | Performed by: PHYSICAL THERAPIST

## 2022-03-08 PROCEDURE — 97110 THERAPEUTIC EXERCISES: CPT | Performed by: PHYSICAL THERAPIST

## 2022-03-08 NOTE — PROGRESS NOTES
The [facility] and [address],  Sports Performance and Rehabilitation, [facility] [address]  793 [facility],5Th Floor   Robbie Wetzel  Phone: [phone]  Fax: [phone]      Physical Therapy Daily Treatment Note  Date:  3/8/2022    Patient Name:  Apolonia Mack    :  1970  MRN: [id number]  Restrictions/Precautions:    Medical/Treatment Diagnosis Information:  · Diagnosis: S83.242A (ICD-10-CM) - Acute medial meniscus tear of left knee, initial [id number] (ICD-10-CM) - Acute lateral meniscus tear of left knee, initial encounter  · S/p L knee PMM, PLM DOS 22  · Treatment Diagnosis: M25.652, M25.462, R53.1, X50.3  Insurance/Certification information:  PT Insurance Information: [facility]  Physician Information:  Referring Practitioner: Tatianna Carrion MD  Has the plan of care been signed (Y/N):        []  Yes  [x]  No     Date of Patient follow up with Physician: 22  Patient seen in consultation with Dr. Jeremy Dupree who established the initial/subsequent treatment protocol. 22: reviewed surgical findings, will continue conservative approach for OA management  3-1-22: seen for 3 week check in MD office       Is this a Progress Report:     [x]  Yes  []  No        If Yes:  Date Range for reporting period:  Beginning 22  Ending 3/8/22    Progress report will be due (10 Rx or 30 days whichever is less): 4/3/93       Recertification will be due (POC Duration  / 90 days whichever is less): 22         Visit # Insurance Allowable Auth Required   IE+8   20 []  Yes [x]  No        Functional Scale:    Date assessed:    LEFS Score: 44/80=55%   22   LEFS Score: 48/80= 60% (40% deficit) 3/8/22  Latex Allergy:  [x]NO      []YES  Preferred Language for Healthcare:   [x]English       []other:    Pain level:  2/10     SUBJECTIVE: Pain at medial and lateral port sites.      OBJECTIVE:      Flexibility L R Comment   Hamstring Mild tightness       Gastroc Mild tightness ITB Not assessed       Quad Moderate tightness                              ROM PROM AROM Overpressure Comment     L R L R L R     Flexion 130 prone    120 supine            Extension 0   0                                                  MMT not assessed d/t recent surgery  Strength L R Comment   Quad Quad set: good        Hamstring         Gastroc         Hip  flexion         Hip abd                                Not assessed d/t recent surgery  Special Test Results/Comment   Meniscal Click     Crepitus     Flexion Test     Valgus Laxity     Varus Laxity     Lachmans     Drop Back     Homans -         Girth L R   Mid Patella 40.0 cm 39.5 cm   Suprapatellar 42 cm 41.5 cm   5cm above 44.0 cm 44.2 cm      Reflexes/Sensation:               [x]Dermatomes/Myotomes intact               []Reflexes equal and normal bilaterally              []Other:     Joint mobility:              [x]Normal                      []Hypo              []Hyper     Palpation: Generalized TTP around surgical portals     Functional Mobility/Transfers:      Posture: WNL     Bandages/Dressings/Incisions:   2/4/22 Post op dressings removed in clinic. No s/s of infection. Incisions clean and dry. +bogginess at distal quad. 3/8/22: Incisions healed well      Gait:   2/4/22 Arrives PWB with RW. WBAT, decreased step length, decreased toe off and heel strike, decreased knee flexion. 2/18/22 FWB without AD. Flat foot contact with LLE, decreased stance time, decreased stride length, mild shuffling of feet.   3/8/22: No AD, decreased stance time on the L       RESTRICTIONS/PRECAUTIONS:     Exercises/Interventions:   Exercise/Equipment Resistance/Repetitions Other comments 3/8/2022   Stretching      Hamstring 3x30\"  x       Inclined Calf 3x30''  x   Hip Flexion      ITB      Groin      Quad 3x30\" prone   x   ERMI  5x30''     Standing toe extension HEP     Standing Gastroc HEP     Ankle circles/AROM 1x10 CW, CCW, inv, eversion, PF AROM           SLR Supine     Abduction 3x10     Adduction      Prone      SLR+            Isometrics      Quad sets           Patellar Glides      Medial      Superior      Inferior            ROM      Sheet Pulls     Hang Weights      Passive      Active      Weight Shift      Ankle Pumps      Bike  5' REC Seat 9  Res 1.5  x               CKC      Calf raises     Wall sits      Step ups      1 leg stand          CC TKE 30x3\" 20# hoist  NPV   Balance      bridges      rockerboard 1' ea A/P tipping and balance NPV   PRE      RANGE:    Flexion  RANGE:    4 way ankle  Attempted, d/c'd due to limited mobility            Quantum machines      Leg press  2x10 DL   2x10 #  85#   X  x   Leg extension 3x10 DL 90-0°, 50# x   Leg curl 3x10 DL 0-90°, 40# x         Manual interventions STM to inferior portals 5'     IASTM 8' post tib and tibialis ant, medial/anterior ankle  HG 9 strumming, brushing, stroking        TM 5'  Gait - vc's heel strike>toe off      Plan for next session: cont strength progression/ balance    Therapeutic Exercise and NMR EXR  [x] (55262) Provided verbal/tactile cueing for activities related to strengthening, flexibility, endurance, ROM for improvements in LE, proximal hip, and core control with self care, mobility, lifting, ambulation. [x] (63522) Provided verbal/tactile cueing for activities related to improving balance, coordination, kinesthetic sense, posture, motor skill, proprioception  to assist with LE, proximal hip, and core control in self care, mobility, lifting, ambulation and eccentric single leg control.      NMR and Therapeutic Activities:    [x] (43116 or 55744) Provided verbal/tactile cueing for activities related to improving balance, coordination, kinesthetic sense, posture, motor skill, proprioception and motor activation to allow for proper function of core, proximal hip and LE with self care and ADLs  [x] (59910) Gait Re-education- Provided training and instruction to the patient for proper LE, core and proximal hip recruitment and positioning and eccentric body weight control with ambulation re-education including up and down stairs     Home Exercise Program:    Access Code: [email or web address]  URL: [email or web address]  Date: 02/16/2022  Prepared by: Jeb Manjarrez    Exercises  · Seated Table Hamstring Stretch - 3 x daily - 7 x weekly - 1 sets - 3 reps - 30 second hold  · Long Sitting Calf Stretch with Strap - 3 x daily - 7 x weekly - 1 sets - 3 reps - 30 second hold  · Supine Heel Slide with Strap - 3 x daily - 7 x weekly - 1 sets - 10 reps - 10 second hold  · Supine Quad Set - 3 x daily - 7 x weekly - 1 sets - 10 reps - 10 second hold  · Standing Heel Raise - 3 x daily - 7 x weekly - 3 sets - 10 reps  · Seated Long Arc Quad - 3 x daily - 7 x weekly - 3 sets - 10 reps  · Mini Squat with Counter Support - 2 x daily - 5 x weekly - 3 sets - 10 reps  · Supine Active Straight Leg Raise - 3 x daily - 7 x weekly - 3 sets - 10 reps  · Supine Ankle Circles - 3 x daily - 7 x weekly - 3 sets - 10 reps  · Standing Toe Dorsiflexion Stretch - 3 x daily - 7 x weekly - 1 sets - 3 reps - 30 sec hold  · Standing Gastroc Stretch - 3 x daily - 7 x weekly - 1 sets - 3 reps - 30 sec hold    [x] (57501) Reviewed/Progressed HEP activities related to strengthening, flexibility, endurance, ROM of core, proximal hip and LE for functional self-care, mobility, lifting and ambulation/stair navigation   [x] (70368)Reviewed/Progressed HEP activities related to improving balance, coordination, kinesthetic sense, posture, motor skill, proprioception of core, proximal hip and LE for self care, mobility, lifting, and ambulation/stair navigation      Manual Treatments:    [x] (56775) Provided manual therapy to mobilize LE, proximal hip and/or LS spine soft tissue/joints for the purpose of modulating pain, promoting relaxation,  increasing ROM, reducing/eliminating soft tissue swelling/inflammation/restriction, improving soft tissue extensibility and allowing for proper ROM for normal function with self care, mobility, lifting and ambulation. Modalities:  Ice cup 8'    Charges:  Timed Code Treatment Minutes: 47   Total Treatment Minutes: 55   4:17- 5:12    [] EVAL (LOW) 14596 (typically 20 minutes face-to-face)  [] EVAL (MOD) 94026 (typically 30 minutes face-to-face)  [] EVAL (HIGH) 44410 (typically 45 minutes face-to-face)  [] RE-EVAL   [x] WP(18771) x 1    [] IONTO  [x] NMR (95168) x  1   [] VASO  [] Manual (32777) x     [] Other:  [x] TA x  1    [] Mech Traction (85140)  [] ES(attended) (96850)      [] ES (un) (28647):     GOALS:   Patient stated goal: return to work   [] Progressing: [] Met: [] Not Met: [] Adjusted     Therapist goals for Patient:   Short Term Goals: To be achieved in: 2 weeks 2/18/22  1. Independent in HEP and progression per patient tolerance, in order to prevent re-injury. [] Progressing: [x] Met: [] Not Met: [] Adjusted   2. Patient will have a decrease in pain to facilitate improvement in movement, function, and ADLs as indicated by Functional Deficits. [] Progressing: [x] Met: [] Not Met: [] Adjusted    3. Patient will demonstrate reciprocal gait pattern without use of AD for improved home and community ambulation. [] Progressing: [x] Met: [] Not Met: [] Adjusted      Long Term Goals: To be achieved in: 12 weeks 4/29/22  1. Disability index score of 15% or less for the LEFS to assist with reaching prior level of function. [x] Progressing: [] Met: [] Not Met: [] Adjusted  2. Patient will demonstrate increased AROM to 0-140 deg or greater to allow for proper joint functioning as indicated by patients Functional Deficits. [x] Progressing: [] Met: [] Not Met: [] Adjusted  3. Patient will demonstrate an increase in LLE strength to 4+/5 or greater to allow for proper functional mobility as indicated by patients Functional Deficits. [x] Progressing: [] Met: [] Not Met: [] Adjusted  4.  Patient will return to ADLs, IADLs and functional activities without increased symptoms or restriction. [x] Progressing: [] Met: [] Not Met: [] Adjusted  5. Patient will tolerate standing >1 hour and be able to negotiate stairs with 0-1/10 pain to allow for return to work. [x] Progressing: [] Met: [] Not Met: [] Adjusted         Overall Progression Towards Functional goals/ Treatment Progress Update:  [x] Patient is progressing as expected towards functional goals listed. [] Progression is slowed due to complexities/Impairments listed. [] Progression has been slowed due to co-morbidities. [] Plan just implemented, too soon to assess goals progression <30days   [] Goals require adjustment due to lack of progress  [] Patient is not progressing as expected and requires additional follow up with physician  [] Other    Prognosis for POC: [x] Good [] Fair  [] Poor      Patient requires continued skilled intervention: [x] Yes  [] No    Treatment/Activity Tolerance:  [x] Patient able to complete treatment  [] Patient limited by fatigue  [] Patient limited by pain     [] Patient limited by other medical complications  [x] Other: Pt exhibits 0 deg of knee extension, and 130 deg of flexion in prone position. Pt reports minimal discomfort to the anterior medial and lateral surgical port sites. A good quad set is noted but no formal MMT performed this visit. Pt continues to walk with decreased stance time on the L leg with increased knee flexion noted. Pt anticipates having the most difficulty with sporting activities, running and hopping but has not attempted yet since surgery. Pt states having moderate difficulty with walking. However, pt has decreased pain with ADLs such as putting on socks/shoes and when getting in/out of the car. Pt tolerated session well and required minimal cueing during the leg press for proper foot placement. Prone quad stretch added, no c/o pain.  Pt requires further PT follow up to address strength, ROM and functional deficits. PLAN: See eval  [x] Continue per plan of care [] Alter current plan (see comments above)  [] Plan of care initiated [] Hold pending MD visit [] Discharge  Hip strength/balance  Updated LEFS  Updated POC    Electronically signed by:  Judy Evangelista, PT DPT, OCS  Physical Therapist  [email or web address]  [email or web address]    Ivonne Bustillos, [facility]  Therapist was present, directed the patient's care, made skilled judgement, and was responsible for assessment and treatment of the patient. Note: If patient does not return for scheduled/ recommended follow up visits, this note will serve as a discharge from care along with most recent update on progress.

## 2022-03-10 ENCOUNTER — HOSPITAL ENCOUNTER (OUTPATIENT)
Dept: PHYSICAL THERAPY | Age: 52
Setting detail: THERAPIES SERIES
Discharge: HOME | End: 2022-03-10
Payer: COMMERCIAL

## 2022-03-10 NOTE — FLOWSHEET NOTE
The [facility] and [address],  Sports Performance and Rehabilitation, [facility] [address]  793 [facility],5Th Floor   Robbie Wetzel  Phone: [phone]  Fax: [phone]      Physical Therapy  Cancellation/No-show Note  Patient Name:  Lex Muniz  :  1970   Date:  3/10/2022  Cancelled visits to date: 1  No-shows to date: 1    For today's appointment patient:  [x]  Cancelled  []  Rescheduled appointment  []  No-show     Reason given by patient:  []  Patient ill  []  Conflicting appointment   []  No transportation    []  Conflict with work  [x]  No reason given  []  Other:     Comments:      Electronically signed by:  Marla Quiñones PT, DPT, OCS  Physical Therapist  [email or web address]  [email or web address]    Jericho Lea, [facility]  Therapist was present, directed the patient's care, made skilled judgement, and was responsible for assessment and treatment of the patient.

## 2022-03-15 ENCOUNTER — HOSPITAL ENCOUNTER (OUTPATIENT)
Dept: PHYSICAL THERAPY | Age: 52
Setting detail: THERAPIES SERIES
Discharge: HOME | End: 2022-03-15
Payer: COMMERCIAL

## 2022-03-15 NOTE — FLOWSHEET NOTE
The [facility] and [address],  Sports Performance and Rehabilitation, [facility] [address]  793 [facility],5Th Floor   Robbie Wetzel  Phone: [phone]  Fax: [phone]      Physical Therapy  Cancellation/No-show Note  Patient Name:  Kike Roman  :  1970   Date:  3/15/2022  Cancelled visits to date: 1  No-shows to date: 2    For today's appointment patient:  [x]  Cancelled  []  Rescheduled appointment  []  No-show     Reason given by patient:  []  Patient ill  []  Conflicting appointment   []  No transportation    []  Conflict with work  [x]  No reason given  [x]  Other:     Comments:  Asked  staff to call pt to schedule    Electronically signed by:  Suzan Ornelas, PT, DPT, OCS  Physical Therapist  [id number]  [email or web address]    Payal Rios, SPT  Therapist was present, directed the patient's care, made skilled judgement, and was responsible for assessment and treatment of the patient.

## 2022-03-17 ENCOUNTER — APPOINTMENT (OUTPATIENT)
Dept: PHYSICAL THERAPY | Age: 52
End: 2022-03-17
Payer: COMMERCIAL

## 2022-05-15 ENCOUNTER — CLINICAL DOCUMENTATION (OUTPATIENT)
Dept: OTHER | Age: 52
End: 2022-05-15

## 2022-11-13 ENCOUNTER — HOSPITAL ENCOUNTER (EMERGENCY)
Age: 52
Discharge: HOME | End: 2022-11-13
Attending: EMERGENCY MEDICINE
Payer: COMMERCIAL

## 2022-11-13 VITALS
TEMPERATURE: 98.2 F | WEIGHT: 250 LBS | BODY MASS INDEX: 37.89 KG/M2 | OXYGEN SATURATION: 98 % | DIASTOLIC BLOOD PRESSURE: 78 MMHG | SYSTOLIC BLOOD PRESSURE: 120 MMHG | RESPIRATION RATE: 16 BRPM | HEART RATE: 68 BPM | HEIGHT: 68 IN

## 2022-11-13 DIAGNOSIS — H60.502 ACUTE OTITIS EXTERNA OF LEFT EAR, UNSPECIFIED TYPE: Primary | ICD-10-CM

## 2022-11-13 PROCEDURE — 6370000000 HC RX 637 (ALT 250 FOR IP): Performed by: EMERGENCY MEDICINE

## 2022-11-13 PROCEDURE — 99283 EMERGENCY DEPT VISIT LOW MDM: CPT

## 2022-11-13 RX ORDER — IBUPROFEN 400 MG/1
400 TABLET ORAL ONCE
Status: COMPLETED | OUTPATIENT
Start: 2022-11-13 | End: 2022-11-13

## 2022-11-13 RX ORDER — NEOMYCIN SULFATE, POLYMYXIN B SULFATE AND HYDROCORTISONE 10; 3.5; 1 MG/ML; MG/ML; [USP'U]/ML
3 SUSPENSION/ DROPS AURICULAR (OTIC) 4 TIMES DAILY
Qty: 10 ML | Refills: 0 | Status: SHIPPED | OUTPATIENT
Start: 2022-11-13 | End: 2022-11-20

## 2022-11-13 RX ORDER — NEOMYCIN SULFATE, POLYMYXIN B SULFATE AND HYDROCORTISONE 10; 3.5; 1 MG/ML; MG/ML; [USP'U]/ML
4 SUSPENSION/ DROPS AURICULAR (OTIC) ONCE
Status: COMPLETED | OUTPATIENT
Start: 2022-11-13 | End: 2022-11-13

## 2022-11-13 RX ADMIN — NEOMYCIN SULFATE, POLYMYXIN B SULFATE AND HYDROCORTISONE 4 DROP: 10; 3.5; 1 SUSPENSION/ DROPS AURICULAR (OTIC) at 06:05

## 2022-11-13 RX ADMIN — IBUPROFEN 400 MG: 400 TABLET, FILM COATED ORAL at 05:46

## 2022-11-13 ASSESSMENT — PAIN DESCRIPTION - ONSET: ONSET: ON-GOING

## 2022-11-13 ASSESSMENT — PAIN DESCRIPTION - DESCRIPTORS: DESCRIPTORS: ACHING

## 2022-11-13 ASSESSMENT — PAIN DESCRIPTION - LOCATION
LOCATION: EAR
LOCATION: EAR

## 2022-11-13 ASSESSMENT — PAIN - FUNCTIONAL ASSESSMENT
PAIN_FUNCTIONAL_ASSESSMENT: ACTIVITIES ARE NOT PREVENTED
PAIN_FUNCTIONAL_ASSESSMENT: 0-10

## 2022-11-13 ASSESSMENT — PAIN DESCRIPTION - FREQUENCY: FREQUENCY: CONTINUOUS

## 2022-11-13 ASSESSMENT — LIFESTYLE VARIABLES
HOW OFTEN DO YOU HAVE A DRINK CONTAINING ALCOHOL: MONTHLY OR LESS
HOW MANY STANDARD DRINKS CONTAINING ALCOHOL DO YOU HAVE ON A TYPICAL DAY: 1 OR 2

## 2022-11-13 ASSESSMENT — PAIN SCALES - GENERAL
PAINLEVEL_OUTOF10: 10
PAINLEVEL_OUTOF10: 10

## 2022-11-13 ASSESSMENT — PAIN DESCRIPTION - PAIN TYPE: TYPE: ACUTE PAIN

## 2022-11-13 ASSESSMENT — PAIN DESCRIPTION - ORIENTATION: ORIENTATION: LEFT

## 2022-11-13 NOTE — ED PROVIDER NOTES
[address] [facility] PROVIDER NOTE    Patient Identification  Pt Name: Meron Herring  MRN: [id number]  Ambergfsudhir 1970  Date of evaluation: 11/13/2022  Provider: Ros Balbuena MD  PCP: Ray Epstein MD    HPI  Meron Herring is a 45 y.o. male who presents to the ED for 4 days of left-sided ear pain, he also has decreased hearing on that left side. No drainage. No fevers or chills. No problems on the right. No recent URI symptoms. .     No other complaints, modifying factors or associated symptoms. Nursing notes reviewed. Allergies: Patient has no known allergies. Past medical history:   Past Medical History:   Diagnosis Date    Appendicitis        Past surgical history:   Past Surgical History:   Procedure Laterality Date    APPENDECTOMY  2010    KNEE ARTHROSCOPY Left 2/2/2022    LEFT KNEE ARTHROSCOPY WITH MEDIAL AND LATERAL PARTIAL MENISCECTOMY performed by Lev Marrero MD at [address] [facility] medications:   Previous Medications    ASPIRIN EC 81 MG EC TABLET    Take 1 tablet by mouth daily    CLOTRIMAZOLE (LOTRIMIN) 1 % CREAM    Apply topically 2 times daily. HYDROCODONE-ACETAMINOPHEN (NORCO) 5-325 MG PER TABLET    Take 1 tablet by mouth every 6 hours as needed. IBUPROFEN (ADVIL;MOTRIN) 800 MG TABLET    Take 800 mg by mouth every 8 hours as needed    NAPROXEN (NAPROSYN) 500 MG TABLET    Take 500 mg by mouth 2 times daily as needed    OMEPRAZOLE (PRILOSEC) 20 MG CAPSULE    Take 1 capsule by mouth daily. ONDANSETRON (ZOFRAN) 4 MG TABLET    Take 1 tablet by mouth every 8 hours as needed for Nausea    SILDENAFIL (VIAGRA) 50 MG TABLET    Take 1 tablet by mouth as needed for Erectile Dysfunction. TRIAMCINOLONE (KENALOG) 0.1 % LOTION    Apply topically to affected area  2- 3 times daily. Social history:  reports that he has never smoked. He has never used smokeless tobacco. He reports current alcohol use of about 5.0 standard drinks per week.  He reports that he does not use drugs. Family history:    Family History   Problem Relation Age of Onset    Diabetes Father     Diabetes Paternal Grandmother     Diabetes Paternal Uncle     Prostate Cancer Paternal Uncle     Prostate Cancer Paternal Uncle        REVIEW OF SYSTEMS  Review of Systems      PHYSICAL EXAM  /66   Pulse 75   Temp 98.2 °F (36.8 °C) (Oral)   Resp 18   Ht 5' 8\" (1.727 m)   Wt 250 lb (113.4 kg)   SpO2 98%   BMI 38.01 kg/m²     Physical Exam  Vitals and nursing note reviewed. Constitutional:       Appearance: Normal appearance. He is well-developed. He is not ill-appearing. HENT:      Head: Normocephalic and atraumatic. Right Ear: Tympanic membrane, ear canal and external ear normal.      Left Ear: External ear normal.      Ears:      Comments: There is significant edema of the left ear canal.  I am unable to even advance the speculum. There is significant white thick discharge. Unable to visualize the tympanic membrane. There is surrounding lymphadenopathy especially anteriorly. Nose: Nose normal.   Eyes:      General: No scleral icterus. Right eye: No discharge. Left eye: No discharge. Conjunctiva/sclera: Conjunctivae normal.   Cardiovascular:      Rate and Rhythm: Normal rate. Pulmonary:      Effort: Pulmonary effort is normal. No respiratory distress. Musculoskeletal:      Cervical back: Neck supple. Skin:     Coloration: Skin is not pale. Neurological:      Mental Status: He is alert. Psychiatric:         Mood and Affect: Mood normal.         Behavior: Behavior normal.         PROCEDURES        EKG:    RADIOLOGY  No orders to display          LABS  Labs Reviewed - No data to display    ED COURSE/MDM  Patient seen and evaluated. Patient appears to has otitis external on the left. Procedure note: An ear wick is inserted into the left ear using alligator forceps.   Following adequate placement for drops of Cortisporin is inserted into the area.  Patient is given ibuprofen for his pain. Patient is counseled on follow-up with ENT. Return instructions discussed with the patient expressed understanding and is agreeable with the treatment plan. Is this patient to be included in the SEP-1 Core Measure due to severe sepsis or septic shock? No   Exclusion criteria - the patient is NOT to be included for SEP-1 Core Measure due to: Infection is not suspected       Patient Referrals:  Quinn Becker MD  [address] [place] [address] [phone]          Discharge Medications:  New Prescriptions    NEOMYCIN-POLYMYXIN-HYDROCORTISONE (CORTISPORIN) 3.5-72044-9 OTIC SUSPENSION    Place 3 drops in ear(s) 4 times daily for 7 days To the affected ear       FINAL IMPRESSION  1. Acute otitis externa of left ear, unspecified type        Blood pressure 118/66, pulse 75, temperature 98.2 °F (36.8 °C), temperature source Oral, resp. rate 18, height 5' 8\" (1.727 m), weight 250 lb (113.4 kg), SpO2 98 %. DISPOSITION  DISPOSITION Decision To Discharge 11/13/2022 06:11:24 AM        Electronically signed by: Rissa Toth M.D. I am the primary clinician of record. This chart was generated using the [address] [place] [address] [address]  [employer] system. I created this record but it may contain dictation errors given the limitations of this technology.        Hoda Izaguirre MD  11/13/22 7094

## 2022-11-13 NOTE — ED NOTES
PT discharged at this time in stable condition, provided discharge education and instructions. PT verbalized understanding, states no further questions or concerns at this time.      Serena Urrutia RN  11/13/22 5144

## 2022-11-14 ENCOUNTER — OFFICE VISIT (OUTPATIENT)
Dept: ENT CLINIC | Age: 52
End: 2022-11-14
Payer: COMMERCIAL

## 2022-11-14 VITALS
HEART RATE: 93 BPM | BODY MASS INDEX: 34.25 KG/M2 | WEIGHT: 226 LBS | TEMPERATURE: 97.4 F | HEIGHT: 68 IN | SYSTOLIC BLOOD PRESSURE: 128 MMHG | DIASTOLIC BLOOD PRESSURE: 79 MMHG

## 2022-11-14 DIAGNOSIS — H60.332 ACUTE SWIMMER'S EAR OF LEFT SIDE: Primary | ICD-10-CM

## 2022-11-14 PROCEDURE — G8427 DOCREV CUR MEDS BY ELIG CLIN: HCPCS | Performed by: OTOLARYNGOLOGY

## 2022-11-14 PROCEDURE — 1036F TOBACCO NON-USER: CPT | Performed by: OTOLARYNGOLOGY

## 2022-11-14 PROCEDURE — 99203 OFFICE O/P NEW LOW 30 MIN: CPT | Performed by: OTOLARYNGOLOGY

## 2022-11-14 PROCEDURE — 4130F TOPICAL PREP RX AOE: CPT | Performed by: OTOLARYNGOLOGY

## 2022-11-14 PROCEDURE — 3017F COLORECTAL CA SCREEN DOC REV: CPT | Performed by: OTOLARYNGOLOGY

## 2022-11-14 PROCEDURE — G8484 FLU IMMUNIZE NO ADMIN: HCPCS | Performed by: OTOLARYNGOLOGY

## 2022-11-14 PROCEDURE — G8417 CALC BMI ABV UP PARAM F/U: HCPCS | Performed by: OTOLARYNGOLOGY

## 2022-11-14 RX ORDER — COLCHICINE 0.6 MG/1
0.6 CAPSULE ORAL DAILY
COMMUNITY

## 2022-11-14 RX ORDER — HYDROCODONE BITARTRATE AND ACETAMINOPHEN 5; 325 MG/1; MG/1
1 TABLET ORAL EVERY 4 HOURS PRN
Qty: 30 TABLET | Refills: 0 | Status: SHIPPED | OUTPATIENT
Start: 2022-11-14 | End: 2022-11-19

## 2022-11-14 ASSESSMENT — ENCOUNTER SYMPTOMS
SORE THROAT: 0
RHINORRHEA: 0
SINUS PAIN: 0

## 2022-11-14 NOTE — PROGRESS NOTES
[facility]       NEW PATIENT VISIT          PCP:  Lashanda Mcmullen MD      REFERRED BY:   ER      CHIEF COMPLAINT  Chief Complaint   Patient presents with    Otitis Externa       HISTORY OF PRESENT ILLNESS    Vanesa Arango is a 45 y.o. male complaining of pain in the left ear. Seen at ER Sunday and put in an otowick and using ear drops. No prior similar episodes. No problem with opposite ear. Ear drainage past two weeks. Pain and headache on left. Denied diabetes. REVIEW OF SYSTEMS   Review of Systems   Constitutional:  Negative for chills and fever. HENT:  Negative for ear discharge, ear pain, rhinorrhea, sinus pain and sore throat. PAST MEDICAL HISTORY    Past Medical History:   Diagnosis Date    Appendicitis     Headache     Tinnitus        Past Surgical History:   Procedure Laterality Date    APPENDECTOMY  01/01/2010    KNEE ARTHROSCOPY Left 02/02/2022    LEFT KNEE ARTHROSCOPY WITH MEDIAL AND LATERAL PARTIAL MENISCECTOMY performed by Zaida Islas MD at [address] [place]:    11/14/22 1310   BP: 128/79   Site: Right Upper Arm   Position: Sitting   Cuff Size: Large Adult   Pulse: 93   Temp: 97.4 °F (36.3 °C)   TempSrc: Infrared   Weight: 226 lb (102.5 kg)   Height: 5' 8\" (1.727 m)     General:  WDWN, NAD, alert and oriented  Face: There was no swelling or lesions detected. Voice: Normal with no hoarseness or hot potato voice. (+) Ears:  Left EAC with marked edema and erythema and exudate, suctions ad otowick inserted and hydrated with neoplyHC suspension. The external ears, mastoids, right TM and right EAC appeared to be normal. Left binocular otomicroscopy performed. Nose: The external nose, nasal septum, turbinates, secretions, and mucosa appeared to be normal.   Sinuses:  Maxillary and frontal sinuses were nontender to palpation and percussion.     Oral cavity:  Mucosa, secretions, tongue, and gingiva appeared to be normal.   Oropharynx:  The palatine tonsils, hard and soft palates, uvula, tongue, posterior oropharyngeal wall, mucosa and secretions appeared to be normal.     Salivary Glands:  Normal bilateral parotid and bilateral submandibular salivary glands. Neck:  No masses or tenderness. Trachea midline. Laryngeal cartilages and hyoid bone normal.  Normal laryngeal crepitus. Thyroid:  Normal, nontender, no goiter or nodules palpable. Lymph nodes:  No cervical lymphadenopathy. Sarina Primas / Hunteror Shivers / Noble Palmer was seen today for otitis externa. Diagnoses and all orders for this visit:    Acute swimmer's ear of left side  -     HYDROcodone-acetaminophen (NORCO) 5-325 MG per tablet; Take 1 tablet by mouth every 4 hours as needed for Pain for up to 5 days. Intended supply: 6 days. Take lowest dose possible to manage pain       RECOMMENDATIONS/PLAN      Patient was advised to use his current supply of neomycin/polymyxin/HC otic suspension 4 drops 3 times a day in the left ear and go to work. Acetaminophen (e.g. Tylenol) or Ibuprofen (e.g. Advil) (over the counter medications) as needed for fever or pain. Remove otowick in 3 days as, possibly replace. Return in about 3 days (around 11/17/2022) for recheck/follow-up, and sooner if condition worsens.

## 2022-11-17 ENCOUNTER — OFFICE VISIT (OUTPATIENT)
Dept: ENT CLINIC | Age: 52
End: 2022-11-17
Payer: COMMERCIAL

## 2022-11-17 VITALS — SYSTOLIC BLOOD PRESSURE: 138 MMHG | DIASTOLIC BLOOD PRESSURE: 88 MMHG | WEIGHT: 223 LBS | BODY MASS INDEX: 33.91 KG/M2

## 2022-11-17 DIAGNOSIS — H60.332 ACUTE SWIMMER'S EAR OF LEFT SIDE: Primary | ICD-10-CM

## 2022-11-17 PROCEDURE — 1036F TOBACCO NON-USER: CPT | Performed by: OTOLARYNGOLOGY

## 2022-11-17 PROCEDURE — G8427 DOCREV CUR MEDS BY ELIG CLIN: HCPCS | Performed by: OTOLARYNGOLOGY

## 2022-11-17 PROCEDURE — G8484 FLU IMMUNIZE NO ADMIN: HCPCS | Performed by: OTOLARYNGOLOGY

## 2022-11-17 PROCEDURE — 4130F TOPICAL PREP RX AOE: CPT | Performed by: OTOLARYNGOLOGY

## 2022-11-17 PROCEDURE — 3017F COLORECTAL CA SCREEN DOC REV: CPT | Performed by: OTOLARYNGOLOGY

## 2022-11-17 PROCEDURE — G8417 CALC BMI ABV UP PARAM F/U: HCPCS | Performed by: OTOLARYNGOLOGY

## 2022-11-17 PROCEDURE — 99213 OFFICE O/P EST LOW 20 MIN: CPT | Performed by: OTOLARYNGOLOGY

## 2022-11-17 NOTE — PROGRESS NOTES
HISTORY:  Ear feels better. EXAMINATION:  Otowick removed from left EAC, still with marked swelling, but improved. Suctioned squamous debris and exudate. Otowick replaced and hydrated with Neomycin/polymyxin/HC suspension. Right TM and EAC normal.        IMPRESSION / DIAGNOSES / ORDERS:   Shawn Rossi was seen today for ear problem. Diagnoses and all orders for this visit:    Acute swimmer's ear of left side        RECOMMENDATIONS / PLAN:   Patient was advised to continue the Neomycin/polymyxin/HC suspension ear drops. Return in about 4 days (around 11/21/2022) for remove otowick and recheck ear .

## 2022-11-21 ENCOUNTER — OFFICE VISIT (OUTPATIENT)
Dept: ENT CLINIC | Age: 52
End: 2022-11-21
Payer: COMMERCIAL

## 2022-11-21 VITALS — SYSTOLIC BLOOD PRESSURE: 145 MMHG | BODY MASS INDEX: 34.52 KG/M2 | DIASTOLIC BLOOD PRESSURE: 81 MMHG | WEIGHT: 227 LBS

## 2022-11-21 DIAGNOSIS — H60.332 ACUTE SWIMMER'S EAR OF LEFT SIDE: Primary | ICD-10-CM

## 2022-11-21 PROCEDURE — G8417 CALC BMI ABV UP PARAM F/U: HCPCS | Performed by: OTOLARYNGOLOGY

## 2022-11-21 PROCEDURE — 3017F COLORECTAL CA SCREEN DOC REV: CPT | Performed by: OTOLARYNGOLOGY

## 2022-11-21 PROCEDURE — 99213 OFFICE O/P EST LOW 20 MIN: CPT | Performed by: OTOLARYNGOLOGY

## 2022-11-21 PROCEDURE — G8484 FLU IMMUNIZE NO ADMIN: HCPCS | Performed by: OTOLARYNGOLOGY

## 2022-11-21 PROCEDURE — G8427 DOCREV CUR MEDS BY ELIG CLIN: HCPCS | Performed by: OTOLARYNGOLOGY

## 2022-11-21 PROCEDURE — 4130F TOPICAL PREP RX AOE: CPT | Performed by: OTOLARYNGOLOGY

## 2022-11-21 PROCEDURE — 1036F TOBACCO NON-USER: CPT | Performed by: OTOLARYNGOLOGY

## 2022-11-21 NOTE — PROGRESS NOTES
[facility]           PCP:  Ivonne Shah MD      [address] [place] [address]  Chief Complaint   Patient presents with    Ear Problem    Other     Sj Gilliland #[id number]       HISTORY OF PRESENT ILLNESS    Jazzy Lloyd is a 45 y.o. male here today for follow-up of severe external otitis. Patient stated that he no longer feels pain in the ear. He is having no drainage from the ear. An audio-video language interpetor was used via i-Pad.  ([place]). Patient stated he is only using the ear drops now. He is not requiring any pain medication. PAST MEDICAL HISTORY    Past Medical History:   Diagnosis Date    Appendicitis     Headache     Tinnitus        Past Surgical History:   Procedure Laterality Date    APPENDECTOMY  01/01/2010    KNEE ARTHROSCOPY Left 02/02/2022    LEFT KNEE ARTHROSCOPY WITH MEDIAL AND LATERAL PARTIAL MENISCECTOMY performed by Sandra Potter MD at [address]:    11/21/22 0856 11/21/22 0857   BP: (!) 145/81 (!) 145/81   Weight: 227 lb (103 kg)      General:  WDWN, NAD, alert and oriented  Face: There was no swelling or lesions detected. Voice: Normal with no hoarseness or hot potato voice. Ears: The otowick was removed from the left external auditory canal.  The canal is much more open with great decrease in swelling. I was able to visualize the tympanic membrane which appeared to be normal and normally mobile to pneumatic massage. The left external ear and mastoid appeared to be normal, including normal pneumatic mobility of the TMs. Left binocular otomicroscopy performed. Ramos Dobbs / Sumeet Horse / Poonam Kya was seen today for ear problem and other. Diagnoses and all orders for this visit:    Acute swimmer's ear of left side  Comments:  Appears to be much improved and nearly resolved.        RECOMMENDATIONS/PLAN      Continue neomycin/polymyxin/HC suspension for five more days, then stop. Acetaminophen (e.g. Tylenol) or Ibuprofen (e.g. Advil) (over the counter medications) as needed for fever or pain. Return in about 1 week (around 11/28/2022), or if symptoms worsen or recur in future or, for any further ENT or sinus problems or symptoms. Patient Instructions   Continue your ear drops for five more days.

## 2024-08-14 ENCOUNTER — OFFICE VISIT (OUTPATIENT)
Dept: ENT CLINIC | Age: 54
End: 2024-08-14

## 2024-08-14 VITALS
OXYGEN SATURATION: 97 % | TEMPERATURE: 97.8 F | WEIGHT: 236 LBS | HEIGHT: 68 IN | BODY MASS INDEX: 35.77 KG/M2 | SYSTOLIC BLOOD PRESSURE: 117 MMHG | HEART RATE: 57 BPM | DIASTOLIC BLOOD PRESSURE: 77 MMHG

## 2024-08-14 DIAGNOSIS — H60.333 ACUTE SWIMMER'S EAR OF BOTH SIDES: Primary | ICD-10-CM

## 2024-08-14 RX ORDER — ALLOPURINOL 100 MG/1
100 TABLET ORAL DAILY
COMMUNITY
Start: 2024-08-02

## 2024-08-14 RX ORDER — ATORVASTATIN CALCIUM 20 MG/1
20 TABLET, FILM COATED ORAL DAILY
COMMUNITY

## 2024-08-14 RX ORDER — CIPROFLOXACIN AND DEXAMETHASONE 3; 1 MG/ML; MG/ML
SUSPENSION/ DROPS AURICULAR (OTIC)
COMMUNITY
Start: 2024-08-07

## 2025-07-23 ENCOUNTER — TELEPHONE (OUTPATIENT)
Dept: INFECTIOUS DISEASES | Age: 55
End: 2025-07-23

## 2025-07-23 NOTE — TELEPHONE ENCOUNTER
Using [facility]  # [id number] attempted to check patient in for virtual visit with Dr Blair. Patient is requesting in person appt. Educated on the need to establish care via virtual appt so as not to expose staff to possible TB until treatment has begun. Patient states he wants the physician to review his history, again educated he would be able to see the Dr and he will still review his history in a virtual visit. Patient states his wife is negative so he doesn't know why he can't come to clinic. Explained his quantiferon was positive and he will need to start treatment before coming to the clinic. He states he will find another Dr that will let him come into the clinic and abruptly hung up on this nurse. Verified this with the  who states he did hang up.     Secure email sent to [facility] RN with above information. Per Dr Blair patient is not to be rescheduled

## 2025-07-23 NOTE — TELEPHONE ENCOUNTER
I called patient on Tuesday July 22, 2025 to confirm his Video Visit appointment with Dr. Blair on Wednesday July 23 @ 10:00a. Advised patient that this appointment is a video visit and not an in person visit. Patient verbalized understanding and states that he will be ready by 10a

## (undated) DEVICE — UNDERGLOVE SURG SZ 9 FNGR THK0.21MIL GRN LTX BEAD CUF

## (undated) DEVICE — TOWEL,STOP FLAG GOLD N-W: Brand: MEDLINE

## (undated) DEVICE — GOWN,SIRUS,POLYRNF,BRTHSLV,XL,30/CS: Brand: MEDLINE

## (undated) DEVICE — STRIP,CLOSURE,WOUND,MEDI-STRIP,1/2X4: Brand: MEDLINE

## (undated) DEVICE — BLADE SHV L13CM DIA4MM CRV TAPR TIP LD TORPEDO COOLCUT

## (undated) DEVICE — BLADE SHV L13CM DIA5MM EXCALIBUR AGG COOLCUT

## (undated) DEVICE — GLOVE SURG SZ 7 CRM LTX FREE POLYISOPRENE POLYMER BEAD ANTI

## (undated) DEVICE — KNEE ARTHROSCOPY: Brand: MEDLINE INDUSTRIES, INC.

## (undated) DEVICE — SUTURE VCRL SZ 0 L18IN ABSRB UD L36MM CT-1 1/2 CIR J840D

## (undated) DEVICE — 87K ARTHROSCOPY TUBING SET: Brand: 87K

## (undated) DEVICE — FLUID TRAP FOR MINIVAC ES EQUIP FLD TRAP

## (undated) DEVICE — GLOVE SURG SZ 7 L12IN FNGR THK79MIL GRN LTX FREE

## (undated) DEVICE — ELECTRODE PT RET AD L9FT HI MOIST COND ADH HYDRGEL CORDED

## (undated) DEVICE — 3M™ IOBAN™ 2 ANTIMICROBIAL INCISE DRAPE 6648EZ: Brand: IOBAN™ 2

## (undated) DEVICE — TUBING FLD MGMT Y DBL SPIK DUALWAVE

## (undated) DEVICE — MENISCECTOMY ELECTRODE BASIC KIT; STANDARD DESIGN, SHORT: Brand: CONMED

## (undated) DEVICE — BLADE SHV L13CM DIA4MM TAPR TIP SCIS LIKE CUT OVL OUTER

## (undated) DEVICE — SUTURE VCRL UD BR CT 3-0 18IN CT1 J838D

## (undated) DEVICE — PENCIL ES ULT VAC W TELSCP NOSE EZ CLN BLDE 10FT

## (undated) DEVICE — Z DISCONTINUED USE 2272117 DRAPE SURG 3 QTR N INVASIVE 2 LAYR DISP

## (undated) DEVICE — GOWN,SIRUS,POLYRNF,BRTHSLV,XLN/XXL,18/CS: Brand: MEDLINE

## (undated) DEVICE — INTENDED FOR TISSUE SEPARATION, AND OTHER PROCEDURES THAT REQUIRE A SHARP SURGICAL BLADE TO PUNCTURE OR CUT.: Brand: BARD-PARKER ® CARBON RIB-BACK BLADES

## (undated) DEVICE — COUNTER NDL 40 COUNT HLD 70 NUM FOAM BLK SGL MAG W BLDE REMV